# Patient Record
Sex: MALE | Race: WHITE | NOT HISPANIC OR LATINO | Employment: FULL TIME | URBAN - METROPOLITAN AREA
[De-identification: names, ages, dates, MRNs, and addresses within clinical notes are randomized per-mention and may not be internally consistent; named-entity substitution may affect disease eponyms.]

---

## 2023-02-05 PROBLEM — M25.371 INSTABILITY OF RIGHT ANKLE JOINT: Status: ACTIVE | Noted: 2023-02-05

## 2023-02-05 PROBLEM — S93.401A SPRAIN OF RIGHT ANKLE: Status: ACTIVE | Noted: 2023-02-05

## 2023-02-05 PROBLEM — M23.90 INTERNAL DERANGEMENT OF KNEE: Status: ACTIVE | Noted: 2023-02-05

## 2023-02-05 PROBLEM — T14.8XXA AVULSION FRACTURE: Status: ACTIVE | Noted: 2023-02-05

## 2023-02-05 PROBLEM — M25.571 RIGHT ANKLE PAIN: Status: ACTIVE | Noted: 2023-02-05

## 2023-02-05 PROBLEM — E83.119 HEMOCHROMATOSIS: Status: ACTIVE | Noted: 2023-02-05

## 2023-02-05 PROBLEM — E83.110 HEREDITARY HEMOCHROMATOSIS (CMS-HCC): Status: ACTIVE | Noted: 2023-02-05

## 2023-02-05 PROBLEM — H81.12 BENIGN PAROXYSMAL POSITIONAL VERTIGO OF LEFT EAR: Status: ACTIVE | Noted: 2023-02-05

## 2023-02-05 PROBLEM — M71.20 BAKERS CYST: Status: ACTIVE | Noted: 2023-02-05

## 2023-02-05 PROBLEM — L40.9 PSORIASIS: Status: ACTIVE | Noted: 2023-02-05

## 2023-02-05 PROBLEM — M25.569 KNEE PAIN: Status: ACTIVE | Noted: 2023-02-05

## 2023-02-05 PROBLEM — S99.911A RIGHT ANKLE INJURY: Status: ACTIVE | Noted: 2023-02-05

## 2023-02-05 RX ORDER — MELOXICAM 15 MG/1
1 TABLET ORAL DAILY PRN
COMMUNITY
Start: 2022-10-07 | End: 2023-09-05

## 2023-03-28 ENCOUNTER — APPOINTMENT (OUTPATIENT)
Dept: PRIMARY CARE | Facility: CLINIC | Age: 48
End: 2023-03-28
Payer: COMMERCIAL

## 2023-03-28 NOTE — PROGRESS NOTES
Jose Larkin is a 47 y.o. male here today for well adult physical.     HPI       Objective    There were no vitals taken for this visit.    Physical Exam       Assessment      {Assess/Plan SmartLinks (Optional):26394:::1}

## 2023-09-05 ENCOUNTER — OFFICE VISIT (OUTPATIENT)
Dept: PRIMARY CARE | Facility: CLINIC | Age: 48
End: 2023-09-05
Payer: COMMERCIAL

## 2023-09-05 VITALS
DIASTOLIC BLOOD PRESSURE: 82 MMHG | HEART RATE: 63 BPM | BODY MASS INDEX: 25.06 KG/M2 | TEMPERATURE: 97.3 F | SYSTOLIC BLOOD PRESSURE: 120 MMHG | WEIGHT: 179 LBS | RESPIRATION RATE: 16 BRPM | HEIGHT: 71 IN

## 2023-09-05 DIAGNOSIS — M25.462 SWELLING OF JOINT OF LEFT KNEE: Primary | ICD-10-CM

## 2023-09-05 PROCEDURE — 1036F TOBACCO NON-USER: CPT | Performed by: FAMILY MEDICINE

## 2023-09-05 PROCEDURE — 99213 OFFICE O/P EST LOW 20 MIN: CPT | Performed by: FAMILY MEDICINE

## 2023-09-05 NOTE — PROGRESS NOTES
"Jose Larkin is a 47 y.o. male here today for   Chief Complaint   Patient presents with    Knee Pain     Left         HPI   Left knee swelling after an active weekend.  No injury.  He says he was doing a lot of hiking and paddle boarding but these are not new activities for him.  He says the joint is not really painful but some pressure.  Swelling is slightly better today.  Using motrin and ice.  No h/o left knee swelling in the past.  There is no erythema or warmth.  There are no other joints involved.  He is not aware of any tick bites or other insect bites.  He has no history of gout.  He does have a history of arthritis in the right knee for which he has seen Dr. Earl in the past.  I reviewed the notes.      Current Outpatient Medications:     ibuprofen (MOTRIN IB ORAL), Take by mouth., Disp: , Rfl:     Patient Active Problem List   Diagnosis    Avulsion fracture    Bakers cyst    Benign paroxysmal positional vertigo of left ear    Hereditary hemochromatosis (CMS/HCC)    Instability of right ankle joint    Internal derangement of knee    Knee pain    Psoriasis    Right ankle injury    Right ankle pain    Sprain of right ankle    Swelling of joint of left knee         No results found for this or any previous visit (from the past 672 hour(s)).     Objective    Visit Vitals  /82   Pulse 63   Temp 36.3 °C (97.3 °F)   Resp 16   Ht 1.803 m (5' 11\")   Wt 81.2 kg (179 lb)   BMI 24.97 kg/m²     Body mass index is 24.97 kg/m².     Physical Exam   Left knee-there is no erythema or warmth.  Patient does have a moderate effusion of the left knee.  There is mild tenderness on the distal medial quadricep tendon but not anywhere else.  He cannot flex the knee very much because of the effusion.  He has full extension.  Drawer test is negative and patellar movement does not reproduce pain.    Assessment    1. Swelling of joint of left knee     I suspect he may have swelling secondary to arthritis and a very " busy long weekend.  No signs or symptoms of an immediately serious or dangerous etiology.  I recommend that he ice the knee 3 times daily for about 15 minutes and he should also take Motrin 2-3 times daily.  He should rest the knee with no sports or high impact activities.  If his knee is not back to normal in 7 to 10 days he can call us and I will refer him back to orthopedics for further evaluation.  I do not think an x-ray would be helpful at this time.

## 2024-01-10 ENCOUNTER — OFFICE VISIT (OUTPATIENT)
Dept: ORTHOPEDIC SURGERY | Facility: CLINIC | Age: 49
End: 2024-01-10
Payer: COMMERCIAL

## 2024-01-10 ENCOUNTER — ANCILLARY PROCEDURE (OUTPATIENT)
Dept: RADIOLOGY | Facility: CLINIC | Age: 49
End: 2024-01-10
Payer: COMMERCIAL

## 2024-01-10 VITALS — HEIGHT: 71 IN | WEIGHT: 187 LBS | BODY MASS INDEX: 26.18 KG/M2

## 2024-01-10 DIAGNOSIS — M25.562 LEFT KNEE PAIN, UNSPECIFIED CHRONICITY: ICD-10-CM

## 2024-01-10 DIAGNOSIS — S83.242S ACUTE MEDIAL MENISCUS TEAR OF LEFT KNEE, SEQUELA: ICD-10-CM

## 2024-01-10 PROCEDURE — 99214 OFFICE O/P EST MOD 30 MIN: CPT | Performed by: STUDENT IN AN ORGANIZED HEALTH CARE EDUCATION/TRAINING PROGRAM

## 2024-01-10 PROCEDURE — 1036F TOBACCO NON-USER: CPT | Performed by: STUDENT IN AN ORGANIZED HEALTH CARE EDUCATION/TRAINING PROGRAM

## 2024-01-10 PROCEDURE — 73564 X-RAY EXAM KNEE 4 OR MORE: CPT | Mod: LT

## 2024-01-10 RX ORDER — METHYLPREDNISOLONE 4 MG/1
TABLET ORAL
Qty: 21 TABLET | Refills: 0 | Status: SHIPPED | OUTPATIENT
Start: 2024-01-10

## 2024-01-10 ASSESSMENT — PAIN - FUNCTIONAL ASSESSMENT: PAIN_FUNCTIONAL_ASSESSMENT: 0-10

## 2024-01-10 ASSESSMENT — PAIN SCALES - GENERAL: PAINLEVEL_OUTOF10: 2

## 2024-01-10 NOTE — PROGRESS NOTES
Chief Complaint   Patient presents with    Left Knee - Pain     Swelling, clicking, Xrays Today; Hx of Rt Knee OA        HPI  48-year-old female presents today for evaluation of his left knee.  Very active.  Enjoys [employer].  Has been having sharp clicking episodes of instability about his left knee.  Does have a history of right knee arthritis treated conservatively.  Presents today for further evaluation and treatment.  States that he has a very sharp pop that occurs particularly when going from flexion to extension and this is very painful in nature.  Presents today for orthopedic evaluation.  No recent history of trauma    History reviewed. No pertinent past medical history.    Past Surgical History:   Procedure Laterality Date    OTHER SURGICAL HISTORY  11/12/2019    Ankle collateral ligament repair        Allergies   Allergen Reactions    Penicillins Unknown        Physical exam    General: Alert and oriented to place, person, and time.  No acute distress and breathing comfortably; pleasant and cooperative with the examination.  HEENT: Head is normocephalic and atraumatic.  Neck: Supple, no visible swelling.  Cardiovascular: Good perfusion to the affected extremity.  Lungs: No audible wheezing or labored breathing.  Abdomen: Nondistended  HEME/Lymph : No visible abnormalities bilateral lower extremity    Extremity:  Left knee:  Skin healthy and intact  No gross swelling or ecchymosis  No significant varus or valgus malalignment  Effusion: Moderate     ROM:  Full flexion   Full extension  No pain with internal rotation of the hip  Tenderness to palpation: Medial joint line     No laxity to valgus stress  No laxity to varus stress  Negative Lachman´s test  Negative anterior drawer test  Negative posterior drawer test  Positive Gianluca´s test     Neurovascular exam normal distally    Diagnostics:  Multiple views left knee: Mild tricompartmental joint space narrowing osteophyte formation and subchondral  sclerosis consistent with early arthritic change.  No acute osseous abnormality no fracture or dislocation appreciated    Procedure:  Procedures    Assessment:  48-year-old male with concerns for medial meniscus tear    Treatment plan:  The natural history of the condition and its associated treatment alternatives including surgical and nonsurgical options were discussed with the patient at length.  The history and clinical exam are consistent with intraarticular pathology and therefore MRI is medically indicated to evaluate the soft tissues of the joint. Follow up in one week or after completion of the MRI to advance management accordingly  The patient understands and agrees with the plan.  Recommend over-the-counter knee brace  Will provide a prescription for oral Medrol Dosepak to provide some relief  All of the patient's questions were answered.

## 2024-01-24 ENCOUNTER — APPOINTMENT (OUTPATIENT)
Dept: RADIOLOGY | Facility: HOSPITAL | Age: 49
End: 2024-01-24
Payer: COMMERCIAL

## 2024-02-23 ENCOUNTER — HOSPITAL ENCOUNTER (OUTPATIENT)
Dept: RADIOLOGY | Facility: HOSPITAL | Age: 49
Discharge: HOME | End: 2024-02-23
Payer: COMMERCIAL

## 2024-02-23 DIAGNOSIS — S83.242S ACUTE MEDIAL MENISCUS TEAR OF LEFT KNEE, SEQUELA: ICD-10-CM

## 2024-02-23 PROCEDURE — 73721 MRI JNT OF LWR EXTRE W/O DYE: CPT | Mod: LEFT SIDE | Performed by: STUDENT IN AN ORGANIZED HEALTH CARE EDUCATION/TRAINING PROGRAM

## 2024-02-23 PROCEDURE — 73721 MRI JNT OF LWR EXTRE W/O DYE: CPT | Mod: LT

## 2024-03-13 ENCOUNTER — OFFICE VISIT (OUTPATIENT)
Dept: ORTHOPEDIC SURGERY | Facility: CLINIC | Age: 49
End: 2024-03-13
Payer: COMMERCIAL

## 2024-03-13 DIAGNOSIS — S83.242S ACUTE MEDIAL MENISCUS TEAR OF LEFT KNEE, SEQUELA: Primary | ICD-10-CM

## 2024-03-13 PROCEDURE — 2500000005 HC RX 250 GENERAL PHARMACY W/O HCPCS: Performed by: STUDENT IN AN ORGANIZED HEALTH CARE EDUCATION/TRAINING PROGRAM

## 2024-03-13 PROCEDURE — 99214 OFFICE O/P EST MOD 30 MIN: CPT | Performed by: STUDENT IN AN ORGANIZED HEALTH CARE EDUCATION/TRAINING PROGRAM

## 2024-03-13 PROCEDURE — 20610 DRAIN/INJ JOINT/BURSA W/O US: CPT | Performed by: STUDENT IN AN ORGANIZED HEALTH CARE EDUCATION/TRAINING PROGRAM

## 2024-03-13 PROCEDURE — 2500000004 HC RX 250 GENERAL PHARMACY W/ HCPCS (ALT 636 FOR OP/ED): Performed by: STUDENT IN AN ORGANIZED HEALTH CARE EDUCATION/TRAINING PROGRAM

## 2024-03-13 PROCEDURE — 1036F TOBACCO NON-USER: CPT | Performed by: STUDENT IN AN ORGANIZED HEALTH CARE EDUCATION/TRAINING PROGRAM

## 2024-03-13 RX ORDER — LIDOCAINE HYDROCHLORIDE 10 MG/ML
4 INJECTION INFILTRATION; PERINEURAL
Status: COMPLETED | OUTPATIENT
Start: 2024-03-13 | End: 2024-03-13

## 2024-03-13 RX ORDER — TRIAMCINOLONE ACETONIDE 40 MG/ML
40 INJECTION, SUSPENSION INTRA-ARTICULAR; INTRAMUSCULAR
Status: COMPLETED | OUTPATIENT
Start: 2024-03-13 | End: 2024-03-13

## 2024-03-13 RX ADMIN — TRIAMCINOLONE ACETONIDE 40 MG: 40 INJECTION, SUSPENSION INTRA-ARTICULAR; INTRAMUSCULAR at 12:53

## 2024-03-13 RX ADMIN — LIDOCAINE HYDROCHLORIDE 4 ML: 10 INJECTION, SOLUTION INFILTRATION; PERINEURAL at 12:53

## 2024-03-13 NOTE — PROGRESS NOTES
Chief Complaint   Patient presents with    Left Knee - Pain     Lt Knee pain, Concern for Medial Meniscus Tear, S/P MDP,  MRI Review       HPI  Patient presents today for follow up of of his left knee.  Patient very active continues to have clicking catching popping about the medial aspect of the knee as well as soreness swelling and discomfort.        Physical exam  General: Alert and oriented to place, person, and time.  No acute distress and breathing comfortably; pleasant and cooperative with the examination.  Extremity:  Left knee:  Skin healthy and intact  No gross swelling or ecchymosis  No significant varus or valgus malalignment  Effusion: Mild     ROM:  Full flexion   Full extension  No pain with internal rotation of the hip  Tenderness to palpation: Medial joint line     No laxity to valgus stress  No laxity to varus stress  Negative Lachman´s test  Negative anterior drawer test  Negative posterior drawer test  Positive Gianluca´s test     Neurovascular exam normal distally    Diagnostics:  MR knee left wo IV contrast    Result Date: 2/23/2024  Interpreted By:  Dmitry Constantino, STUDY: MRI of the  left knee without IV contrast;  2/23/2024 7:50 pm   INDICATION: Signs/Symptoms:pain.   COMPARISON: 01/10/2024   ACCESSION NUMBER(S): [id number]   ORDERING CLINICIAN: JR MCGUIRE   TECHNIQUE: MR imaging of the  left knee was obtained  without IV contrast.   FINDINGS: LIGAMENTS AND TENDONS: The anterior cruciate ligament and the posterior cruciate ligaments are intact. The medial collateral ligament is intact. The lateral collateral ligament, the biceps femoris tendon, the popliteus tendon and the iliotibial band are intact. The quadriceps tendon and the patellar tendon are intact.   MENISCI: Complex tear of the medial meniscal posterior horn and body segments with horizontal oblique and radial components. The lateral meniscus is intact and without evidence of tear.   JOINTS: The articular cartilage of the  medial femoral condyle and medial tibial plateau is intact and without evidence of full thickness defect. Mild diffuse lateral femorotibial articular cartilage thinning. Mild diffuse patellofemoral articular cartilage thinning, predominantly along the lateral facet. There are scattered focal areas of moderate thinning. Small to moderate volume suprapatellar joint effusion.   OSSEOUS STRUCTURES: No focal marrow replacing lesions are identified. There is no fracture.   SOFT TISSUES: There is no muscle atrophy or tear. The common peroneal nerve is intact.       Complex medial meniscus tear.   Mild to moderate patellofemoral and mild lateral femorotibial articular cartilage thinning.   Small to moderate volume suprapatellar joint effusion.   I personally reviewed the images/study and I agree with the findings as stated. This study was interpreted at [place], [place].   MACRO: None   Signed by: Dmitry Constantino 2/23/2024 7:58 PM Dictation workstation:   [id number]       Procedures  L Inj/Asp: L knee on 3/13/2024 12:53 PM  Indications: pain  Details: 22 G needle, anterolateral approach  Medications: 40 mg triamcinolone acetonide 40 mg/mL; 4 mL lidocaine 10 mg/mL (1 %)  Consent was given by the patient. Immediately prior to procedure a time out was called to verify the correct patient, procedure, equipment, support staff and site/side marked as required. Patient was prepped and draped in the usual sterile fashion.            Assessment:  48-year-old male with medial meniscus tear left knee    Treatment plan:  Will proceed with an intra-articular injection today  I discussed risks and benefits of corticosteroid injection and the patient would like to proceed.  Discussed risks of skin depigmentation and the rare but possible intravascular injection of local anesthetic which can cause palpitations or arrhythmias. I discussed the possibility of a transient increase in blood sugar. I  explained that the local anesthetic tends to work immediately, it may take 2-3 days for the full effect of the corticosteroid compound. Consent was obtained and side confirmed by the patient.    Patient's wife was recently diagnosed with colon cancer and he is rather preoccupied with this at this point in time.  Therefore does wish to proceed with an intra-articular injection of the knee.  He will return to clinic after he has a time in his life to proceed with further potential surgical treatment.  In the meantime we will continue with expectant management.  He will return as needed or when he is having worsening symptoms.  In the meantime discussed activities to avoid as well as importance of using pain as a guide  All of the patient's questions concerns answered

## 2024-03-27 ENCOUNTER — PATIENT MESSAGE (OUTPATIENT)
Dept: PRIMARY CARE | Facility: CLINIC | Age: 49
End: 2024-03-27
Payer: COMMERCIAL

## 2024-03-27 DIAGNOSIS — Z12.11 SCREENING FOR COLON CANCER: ICD-10-CM

## 2024-03-27 NOTE — TELEPHONE ENCOUNTER
From: Jose Larkin  To: Sammy Leal MD  Sent: 3/27/2024 5:38 AM EDT  Subject: Routine Colonoscopy     Looking for a referral for a routine colonoscopy screening. I’m 49 this year and never had one. Wife was diagnosed with colon cancer in January. Promised I’d get screened if eligible

## 2024-09-19 ENCOUNTER — HOSPITAL ENCOUNTER (OUTPATIENT)
Dept: OPERATING ROOM | Facility: CLINIC | Age: 49
Discharge: HOME | End: 2024-09-19
Payer: COMMERCIAL

## 2024-09-19 ENCOUNTER — ANESTHESIA (OUTPATIENT)
Dept: OPERATING ROOM | Facility: CLINIC | Age: 49
End: 2024-09-19
Payer: COMMERCIAL

## 2024-09-19 ENCOUNTER — ANESTHESIA EVENT (OUTPATIENT)
Dept: OPERATING ROOM | Facility: CLINIC | Age: 49
End: 2024-09-19
Payer: COMMERCIAL

## 2024-09-19 VITALS
SYSTOLIC BLOOD PRESSURE: 112 MMHG | HEIGHT: 71 IN | DIASTOLIC BLOOD PRESSURE: 73 MMHG | OXYGEN SATURATION: 96 % | HEART RATE: 60 BPM | WEIGHT: 185.19 LBS | RESPIRATION RATE: 16 BRPM | BODY MASS INDEX: 25.93 KG/M2 | TEMPERATURE: 97.5 F

## 2024-09-19 DIAGNOSIS — Z12.11 SCREENING FOR COLON CANCER: ICD-10-CM

## 2024-09-19 PROCEDURE — 7100000010 HC PHASE TWO TIME - EACH INCREMENTAL 1 MINUTE: Performed by: ANESTHESIOLOGY

## 2024-09-19 PROCEDURE — 3600000002 HC OR TIME - INITIAL BASE CHARGE - PROCEDURE LEVEL TWO: Performed by: ANESTHESIOLOGY

## 2024-09-19 PROCEDURE — 2500000004 HC RX 250 GENERAL PHARMACY W/ HCPCS (ALT 636 FOR OP/ED)

## 2024-09-19 PROCEDURE — 3600000007 HC OR TIME - EACH INCREMENTAL 1 MINUTE - PROCEDURE LEVEL TWO: Performed by: ANESTHESIOLOGY

## 2024-09-19 PROCEDURE — 2500000005 HC RX 250 GENERAL PHARMACY W/O HCPCS

## 2024-09-19 PROCEDURE — 3700000001 HC GENERAL ANESTHESIA TIME - INITIAL BASE CHARGE: Performed by: ANESTHESIOLOGY

## 2024-09-19 PROCEDURE — 7100000009 HC PHASE TWO TIME - INITIAL BASE CHARGE: Performed by: ANESTHESIOLOGY

## 2024-09-19 PROCEDURE — 3700000002 HC GENERAL ANESTHESIA TIME - EACH INCREMENTAL 1 MINUTE: Performed by: ANESTHESIOLOGY

## 2024-09-19 PROCEDURE — 45378 DIAGNOSTIC COLONOSCOPY: CPT | Performed by: INTERNAL MEDICINE

## 2024-09-19 RX ORDER — LIDOCAINE IN NACL,ISO-OSMOT/PF 30 MG/3 ML
0.1 SYRINGE (ML) INJECTION ONCE
Status: DISCONTINUED | OUTPATIENT
Start: 2024-09-19 | End: 2024-09-20 | Stop reason: HOSPADM

## 2024-09-19 RX ORDER — PROPOFOL 10 MG/ML
INJECTION, EMULSION INTRAVENOUS CONTINUOUS PRN
Status: DISCONTINUED | OUTPATIENT
Start: 2024-09-19 | End: 2024-09-19

## 2024-09-19 RX ORDER — ONDANSETRON HYDROCHLORIDE 2 MG/ML
4 INJECTION, SOLUTION INTRAVENOUS ONCE AS NEEDED
Status: DISCONTINUED | OUTPATIENT
Start: 2024-09-19 | End: 2024-09-20 | Stop reason: HOSPADM

## 2024-09-19 RX ORDER — SODIUM CHLORIDE, SODIUM LACTATE, POTASSIUM CHLORIDE, CALCIUM CHLORIDE 600; 310; 30; 20 MG/100ML; MG/100ML; MG/100ML; MG/100ML
100 INJECTION, SOLUTION INTRAVENOUS CONTINUOUS
Status: DISCONTINUED | OUTPATIENT
Start: 2024-09-19 | End: 2024-09-20 | Stop reason: HOSPADM

## 2024-09-19 RX ORDER — LIDOCAINE HYDROCHLORIDE 20 MG/ML
INJECTION, SOLUTION INFILTRATION; PERINEURAL AS NEEDED
Status: DISCONTINUED | OUTPATIENT
Start: 2024-09-19 | End: 2024-09-19

## 2024-09-19 RX ORDER — MIDAZOLAM HYDROCHLORIDE 1 MG/ML
INJECTION, SOLUTION INTRAMUSCULAR; INTRAVENOUS AS NEEDED
Status: DISCONTINUED | OUTPATIENT
Start: 2024-09-19 | End: 2024-09-19

## 2024-09-19 SDOH — HEALTH STABILITY: MENTAL HEALTH: CURRENT SMOKER: 0

## 2024-09-19 ASSESSMENT — ENCOUNTER SYMPTOMS
SHORTNESS OF BREATH: 0
ABDOMINAL DISTENTION: 0
SLEEP DISTURBANCE: 0
FEVER: 0
HEADACHES: 0
ARTHRALGIAS: 0
UNEXPECTED WEIGHT CHANGE: 0
COUGH: 0
WHEEZING: 0
DIFFICULTY URINATING: 0
LIGHT-HEADEDNESS: 0
CONFUSION: 0
CHILLS: 0
DIARRHEA: 0
ABDOMINAL PAIN: 0
TROUBLE SWALLOWING: 0
COLOR CHANGE: 0
NAUSEA: 0
SPEECH DIFFICULTY: 0
DIZZINESS: 0
CONSTIPATION: 0
JOINT SWELLING: 0
VOMITING: 0

## 2024-09-19 ASSESSMENT — PAIN SCALES - GENERAL
PAINLEVEL_OUTOF10: 0 - NO PAIN
PAIN_LEVEL: 0
PAINLEVEL_OUTOF10: 0 - NO PAIN
PAINLEVEL_OUTOF10: 0 - NO PAIN

## 2024-09-19 ASSESSMENT — COLUMBIA-SUICIDE SEVERITY RATING SCALE - C-SSRS
2. HAVE YOU ACTUALLY HAD ANY THOUGHTS OF KILLING YOURSELF?: NO
6. HAVE YOU EVER DONE ANYTHING, STARTED TO DO ANYTHING, OR PREPARED TO DO ANYTHING TO END YOUR LIFE?: NO
1. IN THE PAST MONTH, HAVE YOU WISHED YOU WERE DEAD OR WISHED YOU COULD GO TO SLEEP AND NOT WAKE UP?: NO

## 2024-09-19 ASSESSMENT — PAIN - FUNCTIONAL ASSESSMENT
PAIN_FUNCTIONAL_ASSESSMENT: 0-10

## 2024-09-19 NOTE — H&P
History Of Present Illness  Jose Larkin is a 48 y.o. male presenting with COLONOSCOPY   Past Medical History  No past medical history on file.    Surgical History  Past Surgical History:   Procedure Laterality Date    OTHER SURGICAL HISTORY  11/12/2019    Ankle collateral ligament repair        Social History  He reports that he has never smoked. He has never used smokeless tobacco. He reports current alcohol use. He reports that he does not use drugs.    Family History  Family History   Problem Relation Name Age of Onset    Stroke Father          Allergies  Penicillins    Review of Systems   Constitutional:  Negative for chills, fever and unexpected weight change.   HENT:  Negative for congestion and trouble swallowing.    Respiratory:  Negative for cough, shortness of breath and wheezing.    Cardiovascular:  Negative for chest pain.   Gastrointestinal:  Negative for abdominal distention, abdominal pain, constipation, diarrhea, nausea and vomiting.   Genitourinary:  Negative for difficulty urinating.   Musculoskeletal:  Negative for arthralgias and joint swelling.   Skin:  Negative for color change.   Neurological:  Negative for dizziness, speech difficulty, light-headedness and headaches.   Psychiatric/Behavioral:  Negative for confusion and sleep disturbance.         Physical Exam  Constitutional:       General: He is awake.      Appearance: Normal appearance.   HENT:      Head: Normocephalic and atraumatic.      Nose: Nose normal.      Mouth/Throat:      Mouth: Mucous membranes are moist.   Eyes:      Pupils: Pupils are equal, round, and reactive to light.   Neck:      Thyroid: No thyroid mass.      Trachea: Phonation normal.   Cardiovascular:      Rate and Rhythm: Normal rate and regular rhythm.      Heart sounds: Normal heart sounds. No murmur heard.     No gallop.   Pulmonary:      Effort: Pulmonary effort is normal. No respiratory distress.      Breath sounds: Normal air entry. No decreased breath  sounds, wheezing, rhonchi or rales.   Abdominal:      General: Bowel sounds are normal. There is no distension.      Palpations: Abdomen is soft.      Tenderness: There is no abdominal tenderness.   Musculoskeletal:      Cervical back: Neck supple.      Right lower leg: No edema.      Left lower leg: No edema.   Skin:     General: Skin is warm.      Capillary Refill: Capillary refill takes less than 2 seconds.   Neurological:      General: No focal deficit present.      Mental Status: He is alert and oriented to person, place, and time. Mental status is at baseline.      Cranial Nerves: Cranial nerves 2-12 are intact.      Motor: Motor function is intact.   Psychiatric:         Attention and Perception: Attention and perception normal.         Mood and Affect: Mood normal.         Speech: Speech normal.         Behavior: Behavior normal.          Last Recorded Vitals  There were no vitals taken for this visit.    Relevant Results             Assessment/Plan   Assessment & Plan  Screening for colon cancer             PROCEED WITH COLONOSCOPY  Zac Alaniz MD

## 2024-09-19 NOTE — DISCHARGE INSTRUCTIONS
During the first 24 hours after your procedure, you should:    - Resume normal diet, unless otherwise directed by your doctor.  - Resume your home medications, unless otherwise directed by your doctor.  - Refrain from driving or operative heavy machinery.  - Drink plenty of liquids.  - Avoid consuming alcohol.  - Avoid strenuous activity or heavy lifting.    After 24 hours, you can resume regular activity.    Call your doctor office immediately ([phone]) or come to the nearest emergency room if you experience:    - Abdominal tenderness  - Blood in your stool or vomit  - Difficulty urinating or passing stools  - Difficulty breathing  - Chest pain  - Fever       If you experience any problems or have any questions following discharge from the GI Lab, please call:   Dr. Alaniz [phone].   To reach your physician after hours call [phone] and ask for the GI physician on call.

## 2024-09-19 NOTE — ANESTHESIA POSTPROCEDURE EVALUATION
Patient: Jose Larkin    Procedure Summary       Date: 09/19/24 Room / Location: [facility] OR    Anesthesia Start: 1234 Anesthesia Stop: 1303    Procedure: COLONOSCOPY Diagnosis: Screening for colon cancer    Scheduled Providers: Zac Alaniz MD Responsible Provider: Anthony Hernandez MD    Anesthesia Type: MAC ASA Status: 2            Anesthesia Type: MAC    Vitals Value Taken Time   BP See RN flowsheet 09/19/24 1311   Temp  09/19/24 1311   Pulse  09/19/24 1311   Resp  09/19/24 1311   SpO2  09/19/24 1311       Anesthesia Post Evaluation    Patient location during evaluation: PACU  Patient participation: complete - patient participated  Level of consciousness: awake  Pain score: 0  Pain management: adequate  Airway patency: patent  Cardiovascular status: acceptable  Respiratory status: acceptable  Hydration status: acceptable  Postoperative Nausea and Vomiting: none      There were no known notable events for this encounter.

## 2024-09-19 NOTE — ANESTHESIA PREPROCEDURE EVALUATION
Patient: Jose Larkin    Procedure Information       Date/Time: 09/19/24 1200    Scheduled providers: Zac Alaniz MD    Procedure: COLONOSCOPY    Location: [facility] OR            Relevant Problems   Anesthesia (within normal limits)      Cardiac (within normal limits)      Pulmonary (within normal limits)      Neuro (within normal limits)      /Renal (within normal limits)      Liver (within normal limits)      Endocrine (within normal limits)      Hematology  Hereditary hematochromatosis       HEENT (within normal limits)      ID (within normal limits)       Clinical information reviewed:   Tobacco  Allergies  Meds   Med Hx  Surg Hx   Fam Hx  Soc Hx        NPO Detail:  NPO/Void Status  Carbohydrate Drink Given Prior to Surgery? : N  Date of Last Liquid: 09/19/24  Time of Last Liquid: 0930  Date of Last Solid: 09/18/24  Time of Last Solid: 0633  Last Intake Type: Clear fluids  Time of Last Void: 1138         Physical Exam    Airway  Mallampati: I  TM distance: <3 FB  Neck ROM: full     Cardiovascular    Dental    Pulmonary    Abdominal - normal exam             Anesthesia Plan    History of general anesthesia?: yes  History of complications of general anesthesia?: no    ASA 2     MAC     The patient is not a current smoker.  Patient was not previously instructed to abstain from smoking on day of procedure.  Patient did not smoke on day of procedure.    intravenous induction   Anesthetic plan and risks discussed with patient.  Use of blood products discussed with patient who consented to blood products.    Plan discussed with CAA and attending.

## 2024-09-25 ENCOUNTER — OFFICE VISIT (OUTPATIENT)
Dept: ORTHOPEDIC SURGERY | Facility: CLINIC | Age: 49
End: 2024-09-25
Payer: COMMERCIAL

## 2024-09-25 DIAGNOSIS — S83.242S ACUTE MEDIAL MENISCUS TEAR OF LEFT KNEE, SEQUELA: Primary | ICD-10-CM

## 2024-09-25 PROCEDURE — 99214 OFFICE O/P EST MOD 30 MIN: CPT | Performed by: STUDENT IN AN ORGANIZED HEALTH CARE EDUCATION/TRAINING PROGRAM

## 2024-09-25 PROCEDURE — 99214 OFFICE O/P EST MOD 30 MIN: CPT | Mod: 57 | Performed by: STUDENT IN AN ORGANIZED HEALTH CARE EDUCATION/TRAINING PROGRAM

## 2024-09-25 PROCEDURE — 1036F TOBACCO NON-USER: CPT | Performed by: STUDENT IN AN ORGANIZED HEALTH CARE EDUCATION/TRAINING PROGRAM

## 2024-09-25 NOTE — PROGRESS NOTES
Chief Complaint   Patient presents with    Left Knee - Follow-up, Pain     MMT  Discuss sx       HPI  Patient presents today for follow up of of his left knee.  Patient very active continues to have clicking catching popping about the medial aspect of the knee as well as soreness swelling and discomfort.  We have attempted activity modifications oral anti-inflammatories, intra-articular injections with little to no relief.  Patient presents today in hopes of proceeding with diagnostic knee arthroscopy partial meniscectomy.      Physical exam  General: Alert and oriented to place, person, and time.  No acute distress and breathing comfortably; pleasant and cooperative with the examination.  Extremity:  Left knee:  Skin healthy and intact  No gross swelling or ecchymosis  No significant varus or valgus malalignment  Effusion: Mild     ROM:  Full flexion   Full extension  No pain with internal rotation of the hip  Tenderness to palpation: Medial joint line     No laxity to valgus stress  No laxity to varus stress  Negative Lachman´s test  Negative anterior drawer test  Negative posterior drawer test  Positive Gianluca´s test     Neurovascular exam normal distally    Diagnostics:  MR knee left wo IV contrast    Result Date: 2/23/2024  Interpreted By:  Dmitry Constantino, STUDY: MRI of the  left knee without IV contrast;  2/23/2024 7:50 pm   INDICATION: Signs/Symptoms:pain.   COMPARISON: 01/10/2024   ACCESSION NUMBER(S): [id number]   ORDERING CLINICIAN: JR MCGUIRE   TECHNIQUE: MR imaging of the  left knee was obtained  without IV contrast.   FINDINGS: LIGAMENTS AND TENDONS: The anterior cruciate ligament and the posterior cruciate ligaments are intact. The medial collateral ligament is intact. The lateral collateral ligament, the biceps femoris tendon, the popliteus tendon and the iliotibial band are intact. The quadriceps tendon and the patellar tendon are intact.   MENISCI: Complex tear of the medial meniscal  posterior horn and body segments with horizontal oblique and radial components. The lateral meniscus is intact and without evidence of tear.   JOINTS: The articular cartilage of the medial femoral condyle and medial tibial plateau is intact and without evidence of full thickness defect. Mild diffuse lateral femorotibial articular cartilage thinning. Mild diffuse patellofemoral articular cartilage thinning, predominantly along the lateral facet. There are scattered focal areas of moderate thinning. Small to moderate volume suprapatellar joint effusion.   OSSEOUS STRUCTURES: No focal marrow replacing lesions are identified. There is no fracture.   SOFT TISSUES: There is no muscle atrophy or tear. The common peroneal nerve is intact.       Complex medial meniscus tear.   Mild to moderate patellofemoral and mild lateral femorotibial articular cartilage thinning.   Small to moderate volume suprapatellar joint effusion.   I personally reviewed the images/study and I agree with the findings as stated. This study was interpreted at [place], [place].   MACRO: None   Signed by: Dmitry Constantino 2/23/2024 7:58 PM Dictation workstation:   [id number]       Procedures  Procedures     Assessment:  48-year-old male with medial meniscus tear left knee    Treatment plan:  Constellation of findings discussed with Jose in detail, risk benefits alternative treatment discussed with them as well using shared inform decision making wish to proceed with diagnostic knee scope partial medial meniscectomy.  Will obtain medical clearance prior to proceeding.  He does have some pain about the superior lateral aspect of the knee.  Will also focus on the superolateral aspect of the patella for evaluation of the articular cartilage may also benefit from patellofemoral chondroplasty.    We discussed the options of operative versus nonoperative management with the attendant risks and benefits and the  patient is interested in surgical treatment. I have discussed the alternatives of continued nonoperative treatment with observation, physical therapy, and / or medication versus surgery with the patient and we have thoughtfully considered these options. The patient wishes to proceed with surgical treatment.     We will proceed with left knee diagnostic arthroscopy and partial medial meniscectomy    The planned surgical procedure includes examination under anesthesia. Anesthetic risks will be discussed with the patient by the anesthetist. Arthroscopic evaluation will be performed of the knee joint.  Then an arthroscopic procedure will be performed which may include debridement or repair of the the meniscus or cartilage, synovectomy, and removal of loose bodies.  In addition, if there are advanced degenerative changes I have advised the patient that this may indeed be a progressive process, ultimately resulting in further pain at some point in the future.    Risks of the procedure include but are not limited to infection, bleeding, persistent pain, compartment syndrome, neurologic injury, pressure sores or burns related to positioning or equipment, failure of repair if performed, weakness, arthorfibrosis or stiffness of the joint, limited function and/or limited use of the extremity. The rare complication of death was discussed with the patient. Also, the possible need for revision surgery was discussed.     All this was discussed with the patient and consent obtained. All questions were answered. The patient understands and will consider the surgical options with the above risks in mind. If repair is performed of the meniscus a brace may be provided as  part of a treatment plan which will include wearing the immobilizer at all times.    Regarding DVT prophylaxis, recommend generalized ambulation    We will provide a prescription for Norco 15 tabs the day prior to surgery.  The patient will pick this up today before  surgery in anticipation for surgery/postoperative pain control.

## 2024-10-28 ENCOUNTER — LAB (OUTPATIENT)
Dept: LAB | Facility: LAB | Age: 49
End: 2024-10-28
Payer: COMMERCIAL

## 2024-10-28 ENCOUNTER — HOSPITAL ENCOUNTER (OUTPATIENT)
Dept: CARDIOLOGY | Facility: HOSPITAL | Age: 49
Discharge: HOME | End: 2024-10-28
Payer: COMMERCIAL

## 2024-10-28 DIAGNOSIS — Z01.818 PRE-OP EXAMINATION: ICD-10-CM

## 2024-10-28 LAB
ALBUMIN SERPL BCP-MCNC: 4.3 G/DL (ref 3.4–5)
ALP SERPL-CCNC: 45 U/L (ref 33–120)
ALT SERPL W P-5'-P-CCNC: 12 U/L (ref 10–52)
ANION GAP SERPL CALC-SCNC: 10 MMOL/L (ref 10–20)
APTT PPP: 36 SECONDS (ref 27–38)
AST SERPL W P-5'-P-CCNC: 19 U/L (ref 9–39)
BASOPHILS # BLD AUTO: 0.02 X10*3/UL (ref 0–0.1)
BASOPHILS NFR BLD AUTO: 0.4 %
BILIRUB SERPL-MCNC: 1.1 MG/DL (ref 0–1.2)
BUN SERPL-MCNC: 15 MG/DL (ref 6–23)
CALCIUM SERPL-MCNC: 8.8 MG/DL (ref 8.6–10.3)
CHLORIDE SERPL-SCNC: 106 MMOL/L (ref 98–107)
CO2 SERPL-SCNC: 28 MMOL/L (ref 21–32)
CREAT SERPL-MCNC: 1.12 MG/DL (ref 0.5–1.3)
EGFRCR SERPLBLD CKD-EPI 2021: 81 ML/MIN/1.73M*2
EOSINOPHIL # BLD AUTO: 0.12 X10*3/UL (ref 0–0.7)
EOSINOPHIL NFR BLD AUTO: 2.3 %
ERYTHROCYTE [DISTWIDTH] IN BLOOD BY AUTOMATED COUNT: 12.7 % (ref 11.5–14.5)
GLUCOSE SERPL-MCNC: 90 MG/DL (ref 74–99)
HCT VFR BLD AUTO: 44.6 % (ref 41–52)
HGB BLD-MCNC: 15.9 G/DL (ref 13.5–17.5)
IMM GRANULOCYTES # BLD AUTO: 0 X10*3/UL (ref 0–0.7)
IMM GRANULOCYTES NFR BLD AUTO: 0 % (ref 0–0.9)
INR PPP: 1 (ref 0.9–1.1)
LYMPHOCYTES # BLD AUTO: 2.39 X10*3/UL (ref 1.2–4.8)
LYMPHOCYTES NFR BLD AUTO: 46.7 %
MCH RBC QN AUTO: 32.8 PG (ref 26–34)
MCHC RBC AUTO-ENTMCNC: 35.7 G/DL (ref 32–36)
MCV RBC AUTO: 92 FL (ref 80–100)
MONOCYTES # BLD AUTO: 0.41 X10*3/UL (ref 0.1–1)
MONOCYTES NFR BLD AUTO: 8 %
NEUTROPHILS # BLD AUTO: 2.18 X10*3/UL (ref 1.2–7.7)
NEUTROPHILS NFR BLD AUTO: 42.6 %
NRBC BLD-RTO: 0 /100 WBCS (ref 0–0)
PLATELET # BLD AUTO: 170 X10*3/UL (ref 150–450)
POTASSIUM SERPL-SCNC: 4.1 MMOL/L (ref 3.5–5.3)
PROT SERPL-MCNC: 6.4 G/DL (ref 6.4–8.2)
PROTHROMBIN TIME: 11.8 SECONDS (ref 9.8–12.8)
RBC # BLD AUTO: 4.85 X10*6/UL (ref 4.5–5.9)
SODIUM SERPL-SCNC: 140 MMOL/L (ref 136–145)
WBC # BLD AUTO: 5.1 X10*3/UL (ref 4.4–11.3)

## 2024-10-28 PROCEDURE — 85610 PROTHROMBIN TIME: CPT

## 2024-10-28 PROCEDURE — 93010 ELECTROCARDIOGRAM REPORT: CPT | Performed by: INTERNAL MEDICINE

## 2024-10-28 PROCEDURE — 36415 COLL VENOUS BLD VENIPUNCTURE: CPT

## 2024-10-28 PROCEDURE — 85730 THROMBOPLASTIN TIME PARTIAL: CPT

## 2024-10-28 PROCEDURE — 93005 ELECTROCARDIOGRAM TRACING: CPT

## 2024-10-28 PROCEDURE — 85025 COMPLETE CBC W/AUTO DIFF WBC: CPT

## 2024-10-28 PROCEDURE — 80053 COMPREHEN METABOLIC PANEL: CPT

## 2024-11-01 LAB
ATRIAL RATE: 60 BPM
P AXIS: 12 DEGREES
P OFFSET: 171 MS
P ONSET: 127 MS
PR INTERVAL: 172 MS
Q ONSET: 213 MS
QRS COUNT: 9 BEATS
QRS DURATION: 84 MS
QT INTERVAL: 416 MS
QTC CALCULATION(BAZETT): 416 MS
QTC FREDERICIA: 416 MS
R AXIS: -49 DEGREES
T AXIS: -68 DEGREES
T OFFSET: 421 MS
VENTRICULAR RATE: 60 BPM

## 2024-11-11 ENCOUNTER — APPOINTMENT (OUTPATIENT)
Dept: PRIMARY CARE | Facility: CLINIC | Age: 49
End: 2024-11-11
Payer: COMMERCIAL

## 2024-11-11 VITALS
TEMPERATURE: 97 F | HEIGHT: 71 IN | HEART RATE: 80 BPM | SYSTOLIC BLOOD PRESSURE: 112 MMHG | BODY MASS INDEX: 27.3 KG/M2 | DIASTOLIC BLOOD PRESSURE: 70 MMHG | WEIGHT: 195 LBS

## 2024-11-11 DIAGNOSIS — Z01.818 PREOPERATIVE CLEARANCE: Primary | ICD-10-CM

## 2024-11-11 DIAGNOSIS — E83.110 HEREDITARY HEMOCHROMATOSIS (CMS-HCC): ICD-10-CM

## 2024-11-11 PROCEDURE — 99214 OFFICE O/P EST MOD 30 MIN: CPT | Performed by: FAMILY MEDICINE

## 2024-11-11 PROCEDURE — 1036F TOBACCO NON-USER: CPT | Performed by: FAMILY MEDICINE

## 2024-11-11 PROCEDURE — 3008F BODY MASS INDEX DOCD: CPT | Performed by: FAMILY MEDICINE

## 2024-11-11 NOTE — PROGRESS NOTES
Jose Larkin is a 49 y.o. male here today for   Chief Complaint   Patient presents with    Pre-op Exam        HPI   He will be undergoing a left knee diagnostic arthroscopy and partial meniscectomy with Dr. Sharma.  On 11/26/24 at [facility].      Past medical history-patient is relatively healthy and does not take any chronic prescribed medications.  Hemochromatosis -he has not seen his hematologist for a few years.  His most recent hemoglobin was normal and he has no evidence or history of endorgan damage..    No history of heart disease, lung disease, stroke, clotting disorder or bleeding disorder, seizures.    Surgical history - Left ankle repair.  No anesthesia complications.      Social history -  No tobacco or vaping.  ETOH - 5 per week.  No drug use.    Family history - no anesthesia complications.        Current Outpatient Medications:     ibuprofen (MOTRIN IB ORAL), Take by mouth., Disp: , Rfl:     Patient Active Problem List   Diagnosis    Avulsion fracture    Bakers cyst    Benign paroxysmal positional vertigo of left ear    Hereditary hemochromatosis (CMS-HCC)    Instability of right ankle joint    Internal derangement of knee    Knee pain    Psoriasis    Right ankle injury    Swelling of joint of left knee         Recent Results (from the past 4 weeks)   aPTT    Collection Time: 10/28/24  7:29 AM   Result Value Ref Range    aPTT 36 27 - 38 seconds   CBC and Auto Differential    Collection Time: 10/28/24  7:29 AM   Result Value Ref Range    WBC 5.1 4.4 - 11.3 x10*3/uL    nRBC 0.0 0.0 - 0.0 /100 WBCs    RBC 4.85 4.50 - 5.90 x10*6/uL    Hemoglobin 15.9 13.5 - 17.5 g/dL    Hematocrit 44.6 41.0 - 52.0 %    MCV 92 80 - 100 fL    MCH 32.8 26.0 - 34.0 pg    MCHC 35.7 32.0 - 36.0 g/dL    RDW 12.7 11.5 - 14.5 %    Platelets 170 150 - 450 x10*3/uL    Neutrophils % 42.6 40.0 - 80.0 %    Immature Granulocytes %, Automated 0.0 0.0 - 0.9 %    Lymphocytes % 46.7 13.0 - 44.0 %    Monocytes % 8.0 2.0 - 10.0 %     "Eosinophils % 2.3 0.0 - 6.0 %    Basophils % 0.4 0.0 - 2.0 %    Neutrophils Absolute 2.18 1.20 - 7.70 x10*3/uL    Immature Granulocytes Absolute, Automated 0.00 0.00 - 0.70 x10*3/uL    Lymphocytes Absolute 2.39 1.20 - 4.80 x10*3/uL    Monocytes Absolute 0.41 0.10 - 1.00 x10*3/uL    Eosinophils Absolute 0.12 0.00 - 0.70 x10*3/uL    Basophils Absolute 0.02 0.00 - 0.10 x10*3/uL   Comprehensive Metabolic Panel    Collection Time: 10/28/24  7:29 AM   Result Value Ref Range    Glucose 90 74 - 99 mg/dL    Sodium 140 136 - 145 mmol/L    Potassium 4.1 3.5 - 5.3 mmol/L    Chloride 106 98 - 107 mmol/L    Bicarbonate 28 21 - 32 mmol/L    Anion Gap 10 10 - 20 mmol/L    Urea Nitrogen 15 6 - 23 mg/dL    Creatinine 1.12 0.50 - 1.30 mg/dL    eGFR 81 >60 mL/min/1.73m*2    Calcium 8.8 8.6 - 10.3 mg/dL    Albumin 4.3 3.4 - 5.0 g/dL    Alkaline Phosphatase 45 33 - 120 U/L    Total Protein 6.4 6.4 - 8.2 g/dL    AST 19 9 - 39 U/L    Bilirubin, Total 1.1 0.0 - 1.2 mg/dL    ALT 12 10 - 52 U/L   Protime-INR    Collection Time: 10/28/24  7:29 AM   Result Value Ref Range    Protime 11.8 9.8 - 12.8 seconds    INR 1.0 0.9 - 1.1   ECG 12 Lead    Collection Time: 10/28/24  8:13 AM   Result Value Ref Range    Ventricular Rate 60 BPM    Atrial Rate 60 BPM    WV Interval 172 ms    QRS Duration 84 ms    QT Interval 416 ms    QTC Calculation(Bazett) 416 ms    P Axis 12 degrees    R Axis -49 degrees    T Axis -68 degrees    QRS Count 9 beats    Q Onset 213 ms    P Onset 127 ms    P Offset 171 ms    T Offset 421 ms    QTC Fredericia 416 ms        Objective    Visit Vitals    Visit Vitals  /70   Pulse 80   Temp 36.1 °C (97 °F)   Ht 1.803 m (5' 11\")   Wt 88.5 kg (195 lb)   BMI 27.20 kg/m²   Smoking Status Never   BSA 2.11 m²       Body mass index is 27.2 kg/m².     Physical Exam     General - Not in acute distress and cooperative.  Build & Nutrition - Well developed  Posture - Normal  Gait - Normal  Mental Status - alert and oriented x 3    Head - " Normocephalic    Neck - Thyroid normal size    Eyes - Bilateral - Sclera clear and lids pink without edema or mass.      Skin - Warm and dry with no rashes on visible skin    Lungs - Clear to auscultation and normal breathing effort    Cardiovascular - RRR and no murmurs, rubs or thrill.    Peripheral Vascular - Bilateral - no edema present    Neuropsychiatric - normal mood and affect        Assessment & Plan  Preoperative clearance  Patient is low risk for the upcoming surgery and medically cleared for this surgery.  He is cleared for surgery with general or spinal anesthesia.  I did review his preadmission testing labs as above.       Hereditary hemochromatosis (CMS-HCC)  I recommend that he contact his hematologist to set up a follow-up appointment to recheck his ferritin levels and receive therapeutic phlebotomy if needed.  There is no need to delay his surgery since his hemoglobin and hematocrit are normal.                  No orders of the defined types were placed in this encounter.       No orders of the defined types were placed in this encounter.

## 2024-11-12 PROBLEM — S93.401A SPRAIN OF RIGHT ANKLE: Status: RESOLVED | Noted: 2023-02-05 | Resolved: 2024-11-12

## 2024-11-12 PROBLEM — M25.571 RIGHT ANKLE PAIN: Status: RESOLVED | Noted: 2023-02-05 | Resolved: 2024-11-12

## 2024-11-12 NOTE — ASSESSMENT & PLAN NOTE
I recommend that he contact his hematologist to set up a follow-up appointment to recheck his ferritin levels and receive therapeutic phlebotomy if needed.  There is no need to delay his surgery since his hemoglobin and hematocrit are normal.

## 2024-11-14 ENCOUNTER — TELEPHONE (OUTPATIENT)
Dept: ORTHOPEDIC SURGERY | Facility: CLINIC | Age: 49
End: 2024-11-14
Payer: COMMERCIAL

## 2024-11-14 NOTE — TELEPHONE ENCOUNTER
11/14/24  LVM for pt to contact DME office re availability of crutches needed for post-op use following upcoming sx.

## 2024-11-15 ENCOUNTER — TELEPHONE (OUTPATIENT)
Dept: ORTHOPEDIC SURGERY | Facility: CLINIC | Age: 49
End: 2024-11-15
Payer: COMMERCIAL

## 2024-11-22 ENCOUNTER — APPOINTMENT (OUTPATIENT)
Dept: ORTHOPEDIC SURGERY | Facility: CLINIC | Age: 49
End: 2024-11-22
Payer: COMMERCIAL

## 2024-11-25 DIAGNOSIS — S83.242S ACUTE MEDIAL MENISCUS TEAR OF LEFT KNEE, SEQUELA: Primary | ICD-10-CM

## 2024-11-25 RX ORDER — HYDROCODONE BITARTRATE AND ACETAMINOPHEN 5; 325 MG/1; MG/1
1 TABLET ORAL EVERY 6 HOURS PRN
Qty: 15 TABLET | Refills: 0 | Status: SHIPPED | OUTPATIENT
Start: 2024-11-25

## 2024-11-26 PROCEDURE — 29881 ARTHRS KNE SRG MNISECTMY M/L: CPT | Performed by: STUDENT IN AN ORGANIZED HEALTH CARE EDUCATION/TRAINING PROGRAM

## 2024-12-09 ENCOUNTER — OFFICE VISIT (OUTPATIENT)
Dept: ORTHOPEDIC SURGERY | Facility: CLINIC | Age: 49
End: 2024-12-09
Payer: COMMERCIAL

## 2024-12-09 DIAGNOSIS — Z87.828 STATUS POST ARTHROSCOPIC PARTIAL MEDIAL MENISCECTOMY OF LEFT KNEE: Primary | ICD-10-CM

## 2024-12-09 DIAGNOSIS — Z98.890 STATUS POST ARTHROSCOPIC PARTIAL MEDIAL MENISCECTOMY OF LEFT KNEE: Primary | ICD-10-CM

## 2024-12-09 PROCEDURE — 99211 OFF/OP EST MAY X REQ PHY/QHP: CPT | Performed by: STUDENT IN AN ORGANIZED HEALTH CARE EDUCATION/TRAINING PROGRAM

## 2024-12-09 NOTE — PROGRESS NOTES
Chief Complaint   Patient presents with    Left Knee - Post-op     Arthroscopy partial medial meniscectomy, patellofemoral chondroplasty 11/26/2024       History of Present Illness  Patient is a 49-year-old male presenting today for first postop follow-up after left knee arthroscopy partial medial meniscectomy, patellofemoral chondroplasty.  Patient returns today noting minimal pain.  Endorsing a moderate amount of swelling in his knee down to his ankle however this is improving.  Endorsing a moderate amount of bruising as well.  Denies any calf pain or shortness of breath.       Exam  Mild effusion  Healthy incisions - no active drainage  Good range of motion  No calf swelling  Negative Gianna´s test  Distal neurovascular exam intact     Assessment  Patient status post left knee arthroscopy partial medial meniscectomy, patellofemoral chondroplasty, 2 weeks out     Plan  Reviewed arthroscopic photos and findings at length.  Discussed short and long term implications for the knee.  Discussed analgesics, ice, rest.  Encouraged home exercise program, physical therapy.  Follow-up in 4 weeks  XRays at follow up none    Arthroscopic findings of suprapatellar pouch synovitis, grade 3 Outerbridge changes patella, grade 3/4 Outerbridge changes of the trochlea, grade 4 Outerbridge change of the medial femoral condyle, complex medial meniscus tear all discussed with patient in detail today

## 2025-01-13 ENCOUNTER — OFFICE VISIT (OUTPATIENT)
Dept: ORTHOPEDIC SURGERY | Facility: CLINIC | Age: 50
End: 2025-01-13
Payer: COMMERCIAL

## 2025-01-13 DIAGNOSIS — Z87.828 STATUS POST ARTHROSCOPIC PARTIAL MEDIAL MENISCECTOMY OF LEFT KNEE: Primary | ICD-10-CM

## 2025-01-13 DIAGNOSIS — Z98.890 STATUS POST ARTHROSCOPIC PARTIAL MEDIAL MENISCECTOMY OF LEFT KNEE: Primary | ICD-10-CM

## 2025-01-13 PROCEDURE — 99211 OFF/OP EST MAY X REQ PHY/QHP: CPT | Performed by: STUDENT IN AN ORGANIZED HEALTH CARE EDUCATION/TRAINING PROGRAM

## 2025-03-12 ENCOUNTER — OFFICE VISIT (OUTPATIENT)
Dept: ORTHOPEDIC SURGERY | Facility: CLINIC | Age: 50
End: 2025-03-12
Payer: COMMERCIAL

## 2025-03-12 DIAGNOSIS — Z98.890 STATUS POST ARTHROSCOPIC PARTIAL MEDIAL MENISCECTOMY OF LEFT KNEE: Primary | ICD-10-CM

## 2025-03-12 DIAGNOSIS — Z87.828 STATUS POST ARTHROSCOPIC PARTIAL MEDIAL MENISCECTOMY OF LEFT KNEE: Primary | ICD-10-CM

## 2025-03-12 PROCEDURE — 99213 OFFICE O/P EST LOW 20 MIN: CPT | Performed by: STUDENT IN AN ORGANIZED HEALTH CARE EDUCATION/TRAINING PROGRAM

## 2025-03-12 NOTE — PROGRESS NOTES
Chief Complaint   Patient presents with    Left Knee - Post-op     Arthroscopy partial medial meniscectomy, patellofemoral chondroplasty 11/26/2024       History of Present Illness  Patient returns today noting minimal pain and improving activity level. Denies any calf pain or shortness of breath.    Overall feels like he is making progress since last visit.  Ready to start running.     Exam  Trace effusion  Well healed incisions   Full range of motion   Negative Gianna´s test  Distal neurovascular exam intact     Assessment  Patient status post left knee arthroscopy patellofemoral chondroplasty, partial meniscectomy     Plan  Discussed return to activity and home exercise program.  Follow-up in ~ 8 weeks if still symptomatic   Encouraged home exercise program, physical therapy.   XRays at follow up none    Discussed with Jose that I do want him to begin running.  Discussed activities to avoid as well as importance of using pain as a guide discussed that he did have 2 significant findings with his knee arthroscopy namely knee arthritis as well as complex meniscus tear.  Discussed that he if he is having issues with his knee return to clinic in about 2 to 3 months time.  Discussed activities to avoid as well as importance of using pain as a guide.  I encouraged him to begin gentle jogging

## 2025-06-27 ENCOUNTER — APPOINTMENT (OUTPATIENT)
Dept: PRIMARY CARE | Facility: CLINIC | Age: 50
End: 2025-06-27
Payer: COMMERCIAL

## 2025-06-27 VITALS
HEIGHT: 71 IN | TEMPERATURE: 98.1 F | HEART RATE: 67 BPM | RESPIRATION RATE: 16 BRPM | WEIGHT: 191 LBS | BODY MASS INDEX: 26.74 KG/M2 | DIASTOLIC BLOOD PRESSURE: 80 MMHG | SYSTOLIC BLOOD PRESSURE: 128 MMHG

## 2025-06-27 DIAGNOSIS — E83.110 HEREDITARY HEMOCHROMATOSIS: ICD-10-CM

## 2025-06-27 DIAGNOSIS — Z11.4 SCREENING FOR HIV (HUMAN IMMUNODEFICIENCY VIRUS): ICD-10-CM

## 2025-06-27 DIAGNOSIS — Z00.00 ENCOUNTER FOR PREVENTIVE HEALTH EXAMINATION: ICD-10-CM

## 2025-06-27 DIAGNOSIS — M79.652 LEFT THIGH PAIN: ICD-10-CM

## 2025-06-27 DIAGNOSIS — Z11.59 NEED FOR HEPATITIS C SCREENING TEST: Primary | ICD-10-CM

## 2025-06-27 PROBLEM — Z87.828 HISTORY OF TORN MENISCUS OF LEFT KNEE: Status: ACTIVE | Noted: 2024-03-01

## 2025-06-27 PROCEDURE — 99212 OFFICE O/P EST SF 10 MIN: CPT | Performed by: FAMILY MEDICINE

## 2025-06-27 PROCEDURE — 1036F TOBACCO NON-USER: CPT | Performed by: FAMILY MEDICINE

## 2025-06-27 PROCEDURE — 3008F BODY MASS INDEX DOCD: CPT | Performed by: FAMILY MEDICINE

## 2025-06-27 PROCEDURE — 99396 PREV VISIT EST AGE 40-64: CPT | Performed by: FAMILY MEDICINE

## 2025-06-27 ASSESSMENT — PATIENT HEALTH QUESTIONNAIRE - PHQ9
2. FEELING DOWN, DEPRESSED OR HOPELESS: NOT AT ALL
1. LITTLE INTEREST OR PLEASURE IN DOING THINGS: NOT AT ALL
SUM OF ALL RESPONSES TO PHQ9 QUESTIONS 1 AND 2: 0

## 2025-06-27 NOTE — ASSESSMENT & PLAN NOTE
His previous hematologist has retired and he would like to establish with a new hematologist so I will place a referral.  Orders:    Referral To Hematology and Oncology; Future    Ferritin; Future

## 2025-06-27 NOTE — ASSESSMENT & PLAN NOTE
I am not sure what is causing his recurrent left lateral thigh pain and muscle spasm.  I am going to refer him to sports medicine for further evaluation and treatment.    Orders:    Referral to Sports Medicine; Future

## 2025-06-27 NOTE — PROGRESS NOTES
Jose Larkin is a 49 y.o. male here today a periodic health exam.  I reviewed previous preventative health measures including screening tests, immunizations and labs.      HPI   CURRENT COMPLAINTS OR CONCERNS:    Left knee meniscus repair in November.      Sees dermatologist annually.    He has been getting a muscle spasm or muscle knot in his left lateral thigh that occurs about once a week.  He says it has been present for at least a year and seems to be getting more prevalent.  He says when it is sore he has to lay on the floor to put direct pressure on it and stretch and eventually starts to relax.  He has no associated back pain.     PREVIOUS PREVENTATIVE HEALTH    PSA :  NO  Colonoscopy : YES -- DATE 9/19/2024  Cologuard :  NO  Prevnar/Pneumovax : NO  Shingrix : NO  Hepatitis C Antibody :  NO  HIV Screening : NO  Tdap : YES -- DATE 3/16/2020   Yearly Flu Shot :  NO    CURRENT FINDINGS  Healthy Diet : YES  Exercise :  YES --  regularly  Depression or Anxiety Issues : NO  Alcohol Use :  Yes - about 6 per week.  Tobacco Use : NO  Drug Use : NO      Past Medical History    Patient Active Problem List    Diagnosis Date Noted    Hereditary hemochromatosis 02/05/2023    Left thigh pain 06/27/2025    History of torn meniscus of left knee 03/01/2024    Swelling of joint of left knee 09/05/2023    Avulsion fracture 02/05/2023    Bakers cyst 02/05/2023    Benign paroxysmal positional vertigo of left ear 02/05/2023    Instability of right ankle joint 02/05/2023    Internal derangement of knee 02/05/2023    Knee pain 02/05/2023    Psoriasis 02/05/2023    Right ankle injury 02/05/2023       Surgical History[1]     Current Outpatient Medications   Medication Instructions    ibuprofen (MOTRIN IB ORAL) Take by mouth.        Immunization History   Administered Date(s) Administered    Flu vaccine, trivalent, preservative free, age 6 months and greater (Fluarix/Fluzone/Flulaval) 08/01/2014    Moderna SARS-CoV-2 Vaccination  "03/25/2021, 04/21/2021, 11/24/2021    Tdap vaccine, age 7 year and older (BOOSTRIX, ADACEL) 03/16/2020        Social History  Social History     Socioeconomic History    Marital status:      Spouse name: Not on file    Number of children: Not on file    Years of education: Not on file    Highest education level: Not on file   Occupational History    Not on file   Tobacco Use    Smoking status: Never    Smokeless tobacco: Never   Vaping Use    Vaping status: Never Used   Substance and Sexual Activity    Alcohol use: Yes    Drug use: Never    Sexual activity: Not on file   Other Topics Concern    Not on file   Social History Narrative    Not on file     Social Drivers of Health     Financial Resource Strain: Not on file   Food Insecurity: Not on file   Transportation Needs: Not on file   Physical Activity: Not on file   Stress: Not on file   Social Connections: Not on file   Intimate Partner Violence: Not on file   Housing Stability: Not on file        reports current alcohol use.    reports that he has never smoked. He has never used smokeless tobacco.    reports no history of drug use.           Allergies  Penicillins      Physical Exam  Visit Vitals  /80   Pulse 67   Temp 36.7 °C (98.1 °F)   Resp 16   Ht 1.803 m (5' 11\")   Wt 86.6 kg (191 lb)   BMI 26.64 kg/m²   Smoking Status Never   BSA 2.08 m²     Body mass index is 26.64 kg/m².    Physical Exam  Vitals and nursing note reviewed.   Constitutional:       General: He is not in acute distress.     Appearance: Normal appearance.   HENT:      Head: Normocephalic and atraumatic.      Right Ear: Tympanic membrane, ear canal and external ear normal.      Left Ear: Tympanic membrane, ear canal and external ear normal.      Nose: Nose normal.      Mouth/Throat:      Mouth: Mucous membranes are moist.      Pharynx: Oropharynx is clear.   Eyes:      Extraocular Movements: Extraocular movements intact.      Conjunctiva/sclera: Conjunctivae normal.      Pupils: " Pupils are equal, round, and reactive to light.   Cardiovascular:      Rate and Rhythm: Normal rate and regular rhythm.      Pulses: Normal pulses.      Heart sounds: Normal heart sounds. No murmur heard.     No friction rub. No gallop.   Pulmonary:      Effort: Pulmonary effort is normal. No respiratory distress.      Breath sounds: Normal breath sounds.   Abdominal:      General: Abdomen is flat. Bowel sounds are normal. There is no distension.      Palpations: Abdomen is soft.      Tenderness: There is no abdominal tenderness.   Musculoskeletal:         General: Normal range of motion.      Cervical back: Normal range of motion and neck supple.   Lymphadenopathy:      Cervical: No cervical adenopathy.   Skin:     General: Skin is warm and dry.      Findings: No lesion or rash.   Neurological:      General: No focal deficit present.      Mental Status: He is alert. Mental status is at baseline.   Psychiatric:         Mood and Affect: Mood normal.         Behavior: Behavior normal.         Thought Content: Thought content normal.         Judgment: Judgment normal.                 Assessment      Assessment & Plan  Encounter for preventive health examination  Recommend regular exercise, balanced diet, regular dental exams, and healthy habits.  Appropriate labs ordered or reviewed.  I recommend to eat plenty of plant foods (such as whole-grain products, fruits, and vegetables) and a moderate amount of lean and low-fat, animal-based food (meat and dairy products).  When shopping, choose lean meats, fish, and poultry. I recommend to continue regular aerobic exercise.  I recommend a yearly flu shot in the fall and I recommend a yearly wellness exam.        Orders:    Referral To Hematology and Oncology; Future    Comprehensive Metabolic Panel; Future    CBC; Future    Lipid Panel; Future    Ferritin; Future    Need for hepatitis C screening test    Orders:    Hepatitis C Antibody; Future    Screening for HIV (human  immunodeficiency virus)    Orders:    HIV 1/2 Antigen/Antibody Screen with Reflex to Confirmation; Future    Hereditary hemochromatosis  His previous hematologist has retired and he would like to establish with a new hematologist so I will place a referral.  Orders:    Referral To Hematology and Oncology; Future    Ferritin; Future    Left thigh pain  I am not sure what is causing his recurrent left lateral thigh pain and muscle spasm.  I am going to refer him to sports medicine for further evaluation and treatment.    Orders:    Referral to Sports Medicine; Future           Anticipatory Guidance     Eat well: Eat a balanced diet that includes lots of fruits and vegetables, whole grains, nuts, seeds, and legumes. Limit processed foods, sugar, saturated fat, and salt. Aim to eat at least five servings of fruits and vegetables per day, and no more than 1 teaspoon of salt.    Exercise: Try to exercise at least 30 minutes most days of the week.    Sleep: Aim to get 7-9 hours of sleep each night. Establish a bedtime routine and create a sleep-friendly environment.    Stay hydrated: Drink water and limit sugary beverages.    Reduce sitting time: Be mindful of your screen time.    Keep company with good people:  Set limits and boundaries.  Be selective.    Manage stress: Take breaks from the news, talk with someone you trust.    Other tips: Avoid drugs, tobacco and vaping.  Maintain a healthy weight, get regular health checkups, and limit alcohol              Orders Placed This Encounter   Procedures    Comprehensive Metabolic Panel    CBC    Lipid Panel    Ferritin    Hepatitis C Antibody    HIV 1/2 Antigen/Antibody Screen with Reflex to Confirmation    Referral To Hematology and Oncology    Referral to Sports Medicine        No orders of the defined types were placed in this encounter.            [1]   Past Surgical History:  Procedure Laterality Date    OTHER SURGICAL HISTORY  11/12/2019    Ankle collateral ligament  repair

## 2025-07-03 LAB
ALBUMIN SERPL-MCNC: 4.5 G/DL (ref 3.6–5.1)
ALP SERPL-CCNC: 53 U/L (ref 36–130)
ALT SERPL-CCNC: 19 U/L (ref 9–46)
ANION GAP SERPL CALCULATED.4IONS-SCNC: 8 MMOL/L (CALC) (ref 7–17)
AST SERPL-CCNC: 22 U/L (ref 10–40)
BILIRUB SERPL-MCNC: 0.8 MG/DL (ref 0.2–1.2)
BUN SERPL-MCNC: 19 MG/DL (ref 7–25)
CALCIUM SERPL-MCNC: 9.3 MG/DL (ref 8.6–10.3)
CHLORIDE SERPL-SCNC: 105 MMOL/L (ref 98–110)
CHOLEST SERPL-MCNC: 147 MG/DL
CHOLEST/HDLC SERPL: 2.8 (CALC)
CO2 SERPL-SCNC: 29 MMOL/L (ref 20–32)
CREAT SERPL-MCNC: 1.14 MG/DL (ref 0.6–1.29)
EGFRCR SERPLBLD CKD-EPI 2021: 79 ML/MIN/1.73M2
ERYTHROCYTE [DISTWIDTH] IN BLOOD BY AUTOMATED COUNT: 13.3 % (ref 11–15)
FERRITIN SERPL-MCNC: 267 NG/ML (ref 38–380)
GLUCOSE SERPL-MCNC: 87 MG/DL (ref 65–139)
HCT VFR BLD AUTO: 47.6 % (ref 38.5–50)
HCV AB SERPL QL IA: NORMAL
HDLC SERPL-MCNC: 52 MG/DL
HGB BLD-MCNC: 16.2 G/DL (ref 13.2–17.1)
HIV 1+2 AB+HIV1 P24 AG SERPL QL IA: NORMAL
HIV 1+2 AB+HIV1 P24 AG SERPL QL IA: NORMAL
LDLC SERPL CALC-MCNC: 73 MG/DL (CALC)
MCH RBC QN AUTO: 33.1 PG (ref 27–33)
MCHC RBC AUTO-ENTMCNC: 34 G/DL (ref 32–36)
MCV RBC AUTO: 97.3 FL (ref 80–100)
NONHDLC SERPL-MCNC: 95 MG/DL (CALC)
PLATELET # BLD AUTO: 200 THOUSAND/UL (ref 140–400)
PMV BLD REES-ECKER: 10.4 FL (ref 7.5–12.5)
POTASSIUM SERPL-SCNC: 4.4 MMOL/L (ref 3.5–5.3)
PROT SERPL-MCNC: 6.7 G/DL (ref 6.1–8.1)
RBC # BLD AUTO: 4.89 MILLION/UL (ref 4.2–5.8)
SODIUM SERPL-SCNC: 142 MMOL/L (ref 135–146)
TRIGL SERPL-MCNC: 138 MG/DL
WBC # BLD AUTO: 6.8 THOUSAND/UL (ref 3.8–10.8)

## 2025-07-11 ENCOUNTER — APPOINTMENT (OUTPATIENT)
Dept: ORTHOPEDIC SURGERY | Facility: CLINIC | Age: 50
End: 2025-07-11
Payer: COMMERCIAL

## 2025-07-11 ENCOUNTER — ANCILLARY PROCEDURE (OUTPATIENT)
Dept: ORTHOPEDIC SURGERY | Facility: CLINIC | Age: 50
End: 2025-07-11
Payer: COMMERCIAL

## 2025-07-11 DIAGNOSIS — M76.32 ILIOTIBIAL BAND SYNDROME OF LEFT SIDE: ICD-10-CM

## 2025-07-11 DIAGNOSIS — M79.652 LEFT THIGH PAIN: ICD-10-CM

## 2025-07-11 DIAGNOSIS — M25.852 FEMOROACETABULAR IMPINGEMENT OF LEFT HIP: ICD-10-CM

## 2025-07-11 DIAGNOSIS — M54.16 LUMBAR RADICULOPATHY: ICD-10-CM

## 2025-07-11 PROCEDURE — 99214 OFFICE O/P EST MOD 30 MIN: CPT | Performed by: STUDENT IN AN ORGANIZED HEALTH CARE EDUCATION/TRAINING PROGRAM

## 2025-07-11 PROCEDURE — 73552 X-RAY EXAM OF FEMUR 2/>: CPT | Mod: LEFT SIDE | Performed by: STUDENT IN AN ORGANIZED HEALTH CARE EDUCATION/TRAINING PROGRAM

## 2025-07-11 NOTE — PROGRESS NOTES
"Sports Medicine Office Note    Today's Date:  07/11/2025     HPI: Jose Larkin is a 49 y.o. male with history of left knee arthroscopy patellofemoral chondroplasty, partial meniscectomy 11/26/24 who presents today for left lateral thigh pain.  Patient reports 3 to 4-year history of left lateral thigh pain that he describes as burning in nature.  Patient reports he is a avid runner and previous triathlete.  Previous workup demonstrated meniscal tear for which he had surgery with Dr. Sharma November 2024.  Patient reports continued left lateral thigh pain unchanged after meniscus surgery.  Patient denies new fall, trauma, injury, extremity weakness.  Patient feels intermittent tingling in his left 3/4 toes.  Denies saddle anesthesia, bowel or bladder incontinence.    He has no other complaints.    Physical Examination:     SKIN: No increased erythema, warmth, rashes, or concerning skin lesions.  NEURO: Sensation is intact in the bilateral lower extremities. Strength is grossly 5 out of 5 throughout the bilateral lower extremities, unless noted below.  MUSCULOSKELETAL: The {Blank single:31915::\" RIGHT\",\" LEFT\"} hip and pelvis are without obvious signs of acute bony deformity or instability. Active and passive range of motion are {Blank single:77615::\" full and painful\",\" limited and painful\",\" full and pain-free\"}. Log roll is {Blank single:95988::\" positive\",\" negative\"}. Straight leg raise test is {Blank single:76185::\" positive\",\" negative\"}. *** elicited with STACI/FADIR.  {Blank single:11098::\" There is\",\" There is no\"} tenderness to palpation over the greater trochanter and gluteal musculature/tendons. Trendelenburg {Blank single:01929::\" positive\",\" negative\"}. Chayo {Blank single:49154::\" positive\",\" negative\"}. Hip strength is weak as compared to the opposite hip. The opposite hip is otherwise nontender and stable. Gait is antalgic and tandem.      Imaging:  Radiographs of the left thigh were obtained " today and were reviewed and revealed no acute osseous abnormality.    The studies were reviewed by me personally in the office today.    === 01/10/24 ===    XR KNEE LEFT 4+ VIEWS    - Impression -  No acute fracture or dislocation. Mild tricompartmental degenerative  changes of the left knee.      Signed by: Vijay Constantino 1/11/2024 9:16 AM  Dictation workstation:   [id number]    Problem List Items Addressed This Visit           ICD-10-CM       Musculoskeletal and Injuries    Left thigh pain M79.652    Relevant Orders    XR femur left 2+ views     Other Visit Diagnoses         Codes      Lumbar radiculopathy     M54.16    Relevant Orders    XR lumbar spine complete 4+ views      Femoroacetabular impingement of left hip     M25.852    Relevant Orders    Referral to Physical Therapy      Iliotibial band syndrome of left side     M76.32    Relevant Orders    Referral to Physical Therapy          Assessment and Plan:    We reviewed the exam and imaging findings and discussed the conservative and surgical treatment options. We agreed ***. Physical therapy referral *** provided and discussed the importance of regular home exercises.  Recommended prescription doses of Tylenol and encouraged use of ice or heat as needed for acute flares of pain.  Plan for follow-up in *** for re-evaluation, otherwise may follow-up sooner if any new concerns arise.  Discussed this plan with the patient who is understanding and agreeable.    **This note was dictated using Dragon speech recognition software and was not corrected for spelling or grammatical errors**.    Lee Whitehead DO  Primary Care Sports Medicine  [facility] [place]

## 2025-07-14 ENCOUNTER — HOSPITAL ENCOUNTER (OUTPATIENT)
Dept: RADIOLOGY | Facility: CLINIC | Age: 50
Discharge: HOME | End: 2025-07-14
Payer: COMMERCIAL

## 2025-07-14 DIAGNOSIS — M54.16 LUMBAR RADICULOPATHY: ICD-10-CM

## 2025-07-14 PROCEDURE — 72110 X-RAY EXAM L-2 SPINE 4/>VWS: CPT

## 2025-07-14 PROCEDURE — 72110 X-RAY EXAM L-2 SPINE 4/>VWS: CPT | Performed by: RADIOLOGY

## 2025-07-25 ENCOUNTER — APPOINTMENT (OUTPATIENT)
Dept: HEMATOLOGY/ONCOLOGY | Facility: CLINIC | Age: 50
End: 2025-07-25
Payer: COMMERCIAL

## 2025-07-28 ENCOUNTER — APPOINTMENT (OUTPATIENT)
Dept: ORTHOPEDIC SURGERY | Facility: CLINIC | Age: 50
End: 2025-07-28
Payer: COMMERCIAL

## 2025-08-04 ENCOUNTER — APPOINTMENT (OUTPATIENT)
Dept: PHYSICAL THERAPY | Facility: CLINIC | Age: 50
End: 2025-08-04
Payer: COMMERCIAL

## 2025-08-04 DIAGNOSIS — M79.652 LEFT THIGH PAIN: Primary | ICD-10-CM

## 2025-08-04 PROCEDURE — 97110 THERAPEUTIC EXERCISES: CPT | Mod: GP

## 2025-08-04 PROCEDURE — 97161 PT EVAL LOW COMPLEX 20 MIN: CPT | Mod: GP

## 2025-08-04 NOTE — PROGRESS NOTES
Physical Therapy Evaluation and Treatment      Patient Name: Jose Larkin  MRN: [id number]  Today's Date: 8/4/2025  Visit #1  Time Calculation  Start Time: 1433  Stop Time: 1505  Time Calculation (min): 32 min    Insurance:  Info: 2025 20% COINS, 3800 DED (MET) 42494 OOP MAX, VS MED NEC, NO AUTH     Assessment:  Jose Larkin is a 49 year old M referred to outpatient PT for L lateral thigh pain.  At this time, his familiar sx seem muscular in nature and likely related to some combination of glute tendonopathy, and IT band pain/snapping. He subjectively describes some foot paresthesia suggesting possible lumbar origin but ultimately unable to reproduce any of these sx today. Patient will benefit from physical therapy services to improve listed impairments. Initiated treatment today to address these impairments.     Patient with the following impairments: decreased muscle performance, decreased ROM, decreased activity tolerance, pain, participation restrictions, impaired balance/gait, and difficulty with ADL completion    Patient's response to session: No change in pain, Increase motor control, and Increased knowledge and understanding    Next Session Considerations:  Add hip flexor marching w/tb  Band walks  Seated Hip Flexion w/ABD/ADD over cone   other for IT band strength.     Plan:     1 times every week for a total of 10 visits.  Re-assessment after that time.    Current Problem:   1. Left thigh pain            Subjective   Jose Larkin is a 49 year old M referred to outpatient PT for L lateral thigh pain. Pt reports he has been having hip and leg pain to the point he sleeps on the floor at times to have the firm compression on it. It is aggravated when he is more active and seems to be better when he relaxes. It started about a year ago and he has tried multiple treatments options. The thigh pain is on the lateral portion of his thigh and hip. It usually stays in the lateral thigh, he can sometimes  "feel the \"thud\" of the knottiness along his IT band. Occasionally he gets some numbness in the tips of his toes seems to be when the thigh is worse.   Onset Date:   BERNY: Insidious onset.     Pt describes pain as stabbing, throbbing, burning.    Pain is worse with activity.    Pain is better with Laying on the floor, \"super stretch it\"     Pain Rating: (Numeric Pain Scale: #/10)  Current: 4  Best: 0  Worst: 8    Patient denies numbness/tingling, changes in bowel/bladder, saddle paresthesias, and falls.     Relevant Information (PMH & Previous Tests/Imaging): x-ray, no significant Hip findings.     Hx of:  - CVA n  - Heart conditions (afib, HTN, [place]) n  - DM n  - Major Surgeries: L meniscectomy.   - Unexplained Weight Loss/Gain: n      Previous Interventions/Treatments: none     Prior Level of Function (PLOF)  Exercise/Physical Activity: Running        Patient Goal: \"Non-surgical relief.\"                   Objective   Lower Quarter Screen:  -Dermatomes: intact BLE    DTR (L/R)  WNL bilaterally.     LE Myotomes: BLE WNL    Specific Lower Extremity MMT (L/R)  Gluteus Ramírez (prone): 4/5, 4/5  Gluteus Medius: 4-/5, 4/5    Lumbar ROM  Flexion: No loss  Extension: No loss      Repeated Motions  Baseline sx: Min sx in lateral thigh  Flexion: x10 standing   Sx afterward: questionable increase. No peripheralization of sx.     Baseline sx: min sx lateral thigh  Extension: x10 prone pressups.   Sx afterward: LBP, no change in LE sx.     Hip ROM (L/R)  Flexion: 125°/125°  Abduction: 45°/45°  Extension: 10°/10°  External Rotation: 45°/45°  Internal rotation: 45°/45° - minimally provoking of familiar sx.     Special Tests (L/R): (p = positive, n = negative)  Straight Leg Raise: n, n  Wells Crossed SLR: n, n  Slump Test: nt, nt  Femoral Dural Tension: n, n  Scour: n    Joint mobility testing (normal unless otherwise noted below)  Proximal Tibia-Fibular Joint:  PFJ:    Additional Palpable Tenderness/Trigger Points: No palpable " tenderness/trigger points reproduced at this time.     Gait: No gross abnormalities noted w/functional mobility.    Outcome Measures:  LEFS = 6/80       Treatments:  Therapeutic Exercise (27790): 15 minutes   X1 set of ea HEP activity  Patient was educated in depth on relevant anatomy, physiology and potential pathophysiology/PT diagnosis. Discussed tx approach, plan of care, activity modification, symptom monitoring, and home exercise program. Pt provided written handout and verbal education on exercises performed below. Pt instructed to stop exercises if pain occurs during performance at home.  Discussed monitoring foot/toe paresthesia abdiel provoking factors.         HEP:  Access Code: [email or web address]  URL: [email or web address]  Date: 08/04/2025  Prepared by: Perfecto Pike    Exercises  - Clamshell with Resistance (Mirrored)  - 1-2 x daily - 5-7 x weekly - 1 sets - 5 reps - 30sec hold  - Single Leg Bridge (Mirrored)  - 1 x daily - 3-4 x weekly - 3 sets - 10 reps  - Supine Straight Leg Raise with Internal Rotation (Mirrored)  - 1 x daily - 3-4 x weekly - 3 sets - 10 reps  ^  Education and discussion on HEP and treatment regarding the benefits related to current condition, POC, pathophysiology, and precautions        Goals:  Patient will improve Lower Extremity Functional Scale score to < 4/80 to improve sx w/ADLs.     Patient will be independent with home exercise program for proper self-management of condition.    Patient will improve pain free active range of motion in deficit areas for ADL completion.    Patient will improve strength in deficit areas so patient can perform ADLs with less pain.    Lateral thigh pain will improve to be able to sleep in his bed without disruptions for 6 hours daily to achieve adequate rest    Patient will subjectively report the ability to run for >/= 30min without familiar sx > 2/10.

## 2025-08-05 ENCOUNTER — LAB (OUTPATIENT)
Dept: LAB | Facility: CLINIC | Age: 50
End: 2025-08-05
Payer: COMMERCIAL

## 2025-08-05 ENCOUNTER — OFFICE VISIT (OUTPATIENT)
Dept: HEMATOLOGY/ONCOLOGY | Facility: CLINIC | Age: 50
End: 2025-08-05
Payer: COMMERCIAL

## 2025-08-05 VITALS
BODY MASS INDEX: 26.58 KG/M2 | SYSTOLIC BLOOD PRESSURE: 116 MMHG | RESPIRATION RATE: 16 BRPM | WEIGHT: 190.6 LBS | DIASTOLIC BLOOD PRESSURE: 83 MMHG | OXYGEN SATURATION: 93 % | HEART RATE: 62 BPM | TEMPERATURE: 97.9 F

## 2025-08-05 DIAGNOSIS — E83.110 HEREDITARY HEMOCHROMATOSIS: ICD-10-CM

## 2025-08-05 DIAGNOSIS — E83.110 HEREDITARY HEMOCHROMATOSIS: Primary | ICD-10-CM

## 2025-08-05 LAB
ALBUMIN SERPL BCP-MCNC: 4.5 G/DL (ref 3.4–5)
ALP SERPL-CCNC: 59 U/L (ref 33–120)
ALT SERPL W P-5'-P-CCNC: 27 U/L (ref 10–52)
ANION GAP SERPL CALC-SCNC: 12 MMOL/L (ref 10–20)
AST SERPL W P-5'-P-CCNC: 30 U/L (ref 9–39)
BILIRUB SERPL-MCNC: 0.8 MG/DL (ref 0–1.2)
BUN SERPL-MCNC: 12 MG/DL (ref 6–23)
CALCIUM SERPL-MCNC: 9.6 MG/DL (ref 8.6–10.3)
CHLORIDE SERPL-SCNC: 104 MMOL/L (ref 98–107)
CO2 SERPL-SCNC: 29 MMOL/L (ref 21–32)
CREAT SERPL-MCNC: 1.22 MG/DL (ref 0.5–1.3)
EGFRCR SERPLBLD CKD-EPI 2021: 73 ML/MIN/1.73M*2
ERYTHROCYTE [DISTWIDTH] IN BLOOD BY AUTOMATED COUNT: 12.8 % (ref 11.5–14.5)
GLUCOSE SERPL-MCNC: 105 MG/DL (ref 74–99)
HCT VFR BLD AUTO: 46.8 % (ref 41–52)
HGB BLD-MCNC: 16 G/DL (ref 13.5–17.5)
MCH RBC QN AUTO: 32.3 PG (ref 26–34)
MCHC RBC AUTO-ENTMCNC: 34.2 G/DL (ref 32–36)
MCV RBC AUTO: 94 FL (ref 80–100)
NRBC BLD-RTO: NORMAL /100{WBCS}
PLATELET # BLD AUTO: 154 X10*3/UL (ref 150–450)
POTASSIUM SERPL-SCNC: 4.1 MMOL/L (ref 3.5–5.3)
PROT SERPL-MCNC: 7 G/DL (ref 6.4–8.2)
RBC # BLD AUTO: 4.96 X10*6/UL (ref 4.5–5.9)
SODIUM SERPL-SCNC: 141 MMOL/L (ref 136–145)
WBC # BLD AUTO: 5.1 X10*3/UL (ref 4.4–11.3)

## 2025-08-05 PROCEDURE — 85027 COMPLETE CBC AUTOMATED: CPT

## 2025-08-05 PROCEDURE — 99214 OFFICE O/P EST MOD 30 MIN: CPT | Mod: 25 | Performed by: STUDENT IN AN ORGANIZED HEALTH CARE EDUCATION/TRAINING PROGRAM

## 2025-08-05 PROCEDURE — 83540 ASSAY OF IRON: CPT

## 2025-08-05 PROCEDURE — 99204 OFFICE O/P NEW MOD 45 MIN: CPT | Performed by: STUDENT IN AN ORGANIZED HEALTH CARE EDUCATION/TRAINING PROGRAM

## 2025-08-05 PROCEDURE — 85652 RBC SED RATE AUTOMATED: CPT

## 2025-08-05 PROCEDURE — 82728 ASSAY OF FERRITIN: CPT

## 2025-08-05 PROCEDURE — 83615 LACTATE (LD) (LDH) ENZYME: CPT

## 2025-08-05 PROCEDURE — 36415 COLL VENOUS BLD VENIPUNCTURE: CPT

## 2025-08-05 PROCEDURE — 80053 COMPREHEN METABOLIC PANEL: CPT

## 2025-08-05 PROCEDURE — 86140 C-REACTIVE PROTEIN: CPT

## 2025-08-05 ASSESSMENT — PAIN SCALES - GENERAL: PAINLEVEL_OUTOF10: 0-NO PAIN

## 2025-08-05 NOTE — PROGRESS NOTES
Patient ID: Jose Larkin is a 49 y.o. male.  Referring Physician: Sammy Leal MD  [address] [place] [address]  Primary Care Provider: Sammy Leal MD  Visit Type: Initial Visit      Subjective    HPI  Mr. Larkin is a 49 year old male with PMHx significant for hereditary hemochromatosis who presented to our clinic for further management of his hemochromatosis. Per chart review he was diagnosed in his 20's. Most recent labs done on 7/2/25 showed ferritin of 267, hgb 16.2, HCT 47.6, normal WBC and no differential reported. He used to follow up with Dr. Jackson's [facility] meredith at [place], which she has retired and he has not follow up with anyone since 2022. His brother and sister are also tested and they were found to have hereditary hemochromatosis. He has had joint pain in hands, knees and hip. No issues with glycemic control. He denied abdominal pain, skin discoloration or hyperpigmentation, or impotence, chest pain, shortness of breath, orthopnea. His last phlebotomy was in 2022. He had a colonoscopy this year and had no polyps removed and he is scheduled for repeat in 10 years. He has not had any personal history of arterial or venous thrombosis.     Previous history:   3/29/2021  -diagnosed in his 20's with hereditary hemochromatosis, and donated at [facility] regularly, then stopped.  -about 3-5 years ago was seen by hematology at [facility] and retested for hereditary hemochromatosis (he does not know the specific result) and again had several phlebotomies at the center but stopped going.  -on 3/24/21 had labs with annual wellness visit and ferritin is 581. 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  -no c/o, feels well.     Lab:  3/24/2021  WBC 5.4, Hgb 17.1, Hct 49.8, MCV 96, Plt 162; creat  1.06, Ca+ 9.7, t bili 1.3, ast 34, alt 38, alkp 78; ferr 581     5/11/2021  HFE test done 5/25/2018 at [facility] was obtained and shows C282Y homozygous mutations. He started phlebotomies 4/13 and has had two procedures  which he has tolerated well. Reports mild fatigue for a day or two and he adjusts his training schedule a bit. He prefers to continue regular phlebotomies now so he would be able to take a pause around the October full Iron Man Competition event.     6/22/2021  Has been getting phlebotomy every 2 weeks, total of 6 now, and iron levels slowly dropping but still showing ferritin 290 and saturation  of 62%. He said he has some fatigue for a few days after procedure.     8/20/2021  Has had 3 more phlebotomies, with a brief break for his athletic competition. Ferritin levels are improved, saturation fluctuating.  Overall feels well. Has a full iron man competition on 10/2. He thinks a phlebotomy mid Sept will give him adequate recovery time.     10/19/2021  Last phlebotomy was 6 weeks ago on 9/3/21; ferritin had dropped to 57. He completed the Iron Man Competition - satisfied with outcomes.  Feels well today.      3/8/2022 Has been 12 weeks since last phlebotomy, as he missed one appointment due to travel. Feels well, no c/o today.  Review of Systems - Oncology     Objective   BSA: There is no height or weight on file to calculate BSA.  There were no vitals taken for this visit.     has a past medical history of Arthritis (2010), Fracture of ankle (1990), Hemochromatosis (2005), Right ankle pain (02/05/2023), and Sprain of right ankle (02/05/2023).   has a past surgical history that includes Other surgical history (11/12/2019); Ankle fracture surgery (1990); Knee Arthroplasty (2024); and Vasectomy (2013).  Family History[1]  Oncology History    No history exists.       Jose Larkin  reports that he quit smoking about 19 years ago. His smoking use included cigarettes. He has a 10 pack-year smoking history.  He has never used smokeless tobacco.  He  reports current alcohol use of about 6.0 standard drinks of alcohol per week.  He  reports no history of drug use.  Works for "[employer]   Physical Exam  Constitutional:       Appearance: Normal appearance.   HENT:      Head: Normocephalic.      Mouth/Throat:      Mouth: Mucous membranes are moist.     Eyes:      Extraocular Movements: Extraocular movements intact.      Pupils: Pupils are equal, round, and reactive to light.       Cardiovascular:      Rate and Rhythm: Normal rate and regular rhythm.   Pulmonary:      Effort: Pulmonary effort is normal.      Breath sounds: Normal breath sounds.   Abdominal:      General: Abdomen is flat. There is no distension.      Palpations: Abdomen is soft. There is no mass.      Tenderness: There is no abdominal tenderness. There is no guarding.     Musculoskeletal:         General: No swelling.      Cervical back: Normal range of motion. No rigidity.      Right lower leg: No edema.      Left lower leg: No edema.   Lymphadenopathy:      Cervical: No cervical adenopathy.     Skin:     General: Skin is warm and dry.     Neurological:      General: No focal deficit present.      Mental Status: He is alert and oriented to person, place, and time.     Psychiatric:         Mood and Affect: Mood normal.         Behavior: Behavior normal.         WBC   Date/Time Value Ref Range Status   10/28/2024 07:29 AM 5.1 4.4 - 11.3 x10*3/uL Final   05/03/2022 08:34 AM 5.9 4.4 - 11.3 x10E9/L Final   03/07/2022 04:18 PM 6.6 4.4 - 11.3 x10E9/L Final   12/13/2021 02:00 PM 5.9 4.4 - 11.3 x10E9/L Final     WHITE BLOOD CELL COUNT   Date/Time Value Ref Range Status   07/02/2025 04:16 PM 6.8 3.8 - 10.8 Thousand/uL Final     nRBC   Date Value Ref Range Status   10/28/2024 0.0 0.0 - 0.0 /100 WBCs Final     RBC   Date Value Ref Range Status   10/28/2024 4.85 4.50 - 5.90 x10*6/uL Final   05/03/2022 4.97 4.50 - 5.90 x10E12/L Final   03/07/2022 4.88 4.50 - 5.90  x10E12/L Final   12/13/2021 4.90 4.50 - 5.90 x10E12/L Final     RED BLOOD CELL COUNT   Date Value Ref Range Status   07/02/2025 4.89 4.20 - 5.80 Million/uL Final     Hemoglobin   Date Value Ref Range Status   10/28/2024 15.9 13.5 - 17.5 g/dL Final   05/03/2022 16.2 13.5 - 17.5 g/dL Final   03/07/2022 15.9 13.5 - 17.5 g/dL Final   12/13/2021 16.0 13.5 - 17.5 g/dL Final     HEMOGLOBIN   Date Value Ref Range Status   07/02/2025 16.2 13.2 - 17.1 g/dL Final     Hematocrit   Date Value Ref Range Status   10/28/2024 44.6 41.0 - 52.0 % Final   05/03/2022 47.5 41.0 - 52.0 % Final   03/07/2022 45.5 41.0 - 52.0 % Final   12/13/2021 46.0 41.0 - 52.0 % Final     HEMATOCRIT   Date Value Ref Range Status   07/02/2025 47.6 38.5 - 50.0 % Final     MCV   Date/Time Value Ref Range Status   07/02/2025 04:16 PM 97.3 80.0 - 100.0 fL Final   10/28/2024 07:29 AM 92 80 - 100 fL Final   05/03/2022 08:34 AM 96 80 - 100 fL Final   03/07/2022 04:18 PM 93 80 - 100 fL Final   12/13/2021 02:00 PM 94 80 - 100 fL Final     MCH   Date/Time Value Ref Range Status   07/02/2025 04:16 PM 33.1 (H) 27.0 - 33.0 pg Final   10/28/2024 07:29 AM 32.8 26.0 - 34.0 pg Final     MCHC   Date/Time Value Ref Range Status   07/02/2025 04:16 PM 34.0 32.0 - 36.0 g/dL Final     Comment:     For adults, a slight decrease in the calculated MCHC  value (in the range of 30 to 32 g/dL) is most likely  not clinically significant; however, it should be  interpreted with caution in correlation with other  red cell parameters and the patient's clinical  condition.     10/28/2024 07:29 AM 35.7 32.0 - 36.0 g/dL Final   05/03/2022 08:34 AM 34.1 32.0 - 36.0 g/dL Final   03/07/2022 04:18 PM 34.9 32.0 - 36.0 g/dL Final   12/13/2021 02:00 PM 34.8 32.0 - 36.0 g/dL Final     RDW   Date/Time Value Ref Range Status   07/02/2025 04:16 PM 13.3 11.0 - 15.0 % Final   10/28/2024 07:29 AM 12.7 11.5 - 14.5 % Final   05/03/2022 08:34 AM 13.1 11.5 - 14.5 % Final   03/07/2022 04:18 PM 12.6 11.5 - 14.5  % Final   12/13/2021 02:00 PM 12.8 11.5 - 14.5 % Final     Platelets   Date/Time Value Ref Range Status   10/28/2024 07:29  150 - 450 x10*3/uL Final   05/03/2022 08:34  150 - 450 x10E9/L Final   03/07/2022 04:18  150 - 450 x10E9/L Final   12/13/2021 02:00  150 - 450 x10E9/L Final     PLATELET COUNT   Date/Time Value Ref Range Status   07/02/2025 04:16  140 - 400 Thousand/uL Final     MPV   Date/Time Value Ref Range Status   07/02/2025 04:16 PM 10.4 7.5 - 12.5 fL Final     Neutrophils %   Date/Time Value Ref Range Status   10/28/2024 07:29 AM 42.6 40.0 - 80.0 % Final   05/03/2022 08:34 AM 44.3 40.0 - 80.0 % Final   03/07/2022 04:18 PM 66.1 40.0 - 80.0 % Final   12/13/2021 02:00 PM 51.1 40.0 - 80.0 % Final     Immature Granulocytes %, Automated   Date/Time Value Ref Range Status   10/28/2024 07:29 AM 0.0 0.0 - 0.9 % Final     Comment:     Immature Granulocyte Count (IG) includes promyelocytes, myelocytes and metamyelocytes but does not include bands. Percent differential counts (%) should be interpreted in the context of the absolute cell counts (cells/UL).   05/03/2022 08:34 AM 0.2 0.0 - 0.9 % Final     Comment:      Immature Granulocyte Count (IG) includes promyelocytes,    myelocytes and metamyelocytes but does not include bands.   Percent differential counts (%) should be interpreted in the   context of the absolute cell counts (cells/L).     03/07/2022 04:18 PM 0.0 0.0 - 0.9 % Final     Comment:      Immature Granulocyte Count (IG) includes promyelocytes,    myelocytes and metamyelocytes but does not include bands.   Percent differential counts (%) should be interpreted in the   context of the absolute cell counts (cells/L).     12/13/2021 02:00 PM 0.2 0.0 - 0.9 % Final     Comment:      Immature Granulocyte Count (IG) includes promyelocytes,    myelocytes and metamyelocytes but does not include bands.   Percent differential counts (%) should be interpreted in the   context of the  absolute cell counts (cells/L).       Lymphocytes %   Date/Time Value Ref Range Status   10/28/2024 07:29 AM 46.7 13.0 - 44.0 % Final   05/03/2022 08:34 AM 44.5 13.0 - 44.0 % Final   03/07/2022 04:18 PM 25.6 13.0 - 44.0 % Final   12/13/2021 02:00 PM 38.1 13.0 - 44.0 % Final     Monocytes %   Date/Time Value Ref Range Status   10/28/2024 07:29 AM 8.0 2.0 - 10.0 % Final   05/03/2022 08:34 AM 9.0 2.0 - 10.0 % Final   03/07/2022 04:18 PM 7.5 2.0 - 10.0 % Final   12/13/2021 02:00 PM 8.4 2.0 - 10.0 % Final     Eosinophils %   Date/Time Value Ref Range Status   10/28/2024 07:29 AM 2.3 0.0 - 6.0 % Final   05/03/2022 08:34 AM 1.7 0.0 - 6.0 % Final   03/07/2022 04:18 PM 0.5 0.0 - 6.0 % Final   12/13/2021 02:00 PM 1.9 0.0 - 6.0 % Final     Basophils %   Date/Time Value Ref Range Status   10/28/2024 07:29 AM 0.4 0.0 - 2.0 % Final   05/03/2022 08:34 AM 0.3 0.0 - 2.0 % Final   03/07/2022 04:18 PM 0.3 0.0 - 2.0 % Final   12/13/2021 02:00 PM 0.3 0.0 - 2.0 % Final     Neutrophils Absolute   Date/Time Value Ref Range Status   10/28/2024 07:29 AM 2.18 1.20 - 7.70 x10*3/uL Final     Comment:     Percent differential counts (%) should be interpreted in the context of the absolute cell counts (cells/uL).   05/03/2022 08:34 AM 2.59 1.20 - 7.70 x10E9/L Final   03/07/2022 04:18 PM 4.34 1.20 - 7.70 x10E9/L Final   12/13/2021 02:00 PM 2.99 1.20 - 7.70 x10E9/L Final     Immature Granulocytes Absolute, Automated   Date/Time Value Ref Range Status   10/28/2024 07:29 AM 0.00 0.00 - 0.70 x10*3/uL Final     Lymphocytes Absolute   Date/Time Value Ref Range Status   10/28/2024 07:29 AM 2.39 1.20 - 4.80 x10*3/uL Final   05/03/2022 08:34 AM 2.61 1.20 - 4.80 x10E9/L Final   03/07/2022 04:18 PM 1.68 1.20 - 4.80 x10E9/L Final   12/13/2021 02:00 PM 2.23 1.20 - 4.80 x10E9/L Final     Monocytes Absolute   Date/Time Value Ref Range Status   10/28/2024 07:29 AM 0.41 0.10 - 1.00 x10*3/uL Final   05/03/2022 08:34 AM 0.53 0.10 - 1.00 x10E9/L Final   03/07/2022  "04:18 PM 0.49 0.10 - 1.00 x10E9/L Final   12/13/2021 02:00 PM 0.49 0.10 - 1.00 x10E9/L Final     Eosinophils Absolute   Date/Time Value Ref Range Status   10/28/2024 07:29 AM 0.12 0.00 - 0.70 x10*3/uL Final   05/03/2022 08:34 AM 0.10 0.00 - 0.70 x10E9/L Final   03/07/2022 04:18 PM 0.03 0.00 - 0.70 x10E9/L Final   12/13/2021 02:00 PM 0.11 0.00 - 0.70 x10E9/L Final     Basophils Absolute   Date/Time Value Ref Range Status   10/28/2024 07:29 AM 0.02 0.00 - 0.10 x10*3/uL Final   05/03/2022 08:34 AM 0.02 0.00 - 0.10 x10E9/L Final   03/07/2022 04:18 PM 0.02 0.00 - 0.10 x10E9/L Final   12/13/2021 02:00 PM 0.02 0.00 - 0.10 x10E9/L Final       No components found for: \"PT\"  aPTT   Date/Time Value Ref Range Status   10/28/2024 07:29 AM 36 27 - 38 seconds Final       Assessment/Plan    Mr. Larkin is a 49 year old male with PMHx significant for hereditary hemochromatosis who presented to our clinic for further management of his hemochromatosis.     3/29/2021  -diagnosed in his 20's with hereditary hemochromatosis, and donated at [facility] regularly, then stopped.  -about 3-5 years ago was seen by hematology at [place] and retested for hereditary hemochromatosis (he does not know the specific result) and again had several phlebotomies at the center but stopped going.  -on 3/24/21 had labs with annual wellness visit and ferritin is 581. 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  -no c/o, feels well.     Lab: 3/24/2021  WBC 5.4, Hgb 17.1, Hct 49.8, MCV 96, Plt 162; creat 1.06, Ca+ 9.7, t bili 1.3, ast 34, alt 38, alkp 78; ferr 581     5/11/2021  HFE test done 5/25/2018 at [facility] was obtained and shows C282Y homozygous mutations. He started phlebotomies 4/13 and has had two procedures  which he has tolerated well. Reports mild fatigue for " a day or two and he adjusts his training schedule a bit. He prefers to continue regular phlebotomies now so he would be able to take a pause around the October full Iron Man Competition event.     6/22/2021  Has been getting phlebotomy every 2 weeks, total of 6 now, and iron levels slowly dropping but still showing ferritin 290 and saturation  of 62%. He said he has some fatigue for a few days after procedure.     8/20/2021  Has had 3 more phlebotomies, with a brief break for his athletic competition. Ferritin levels are improved, saturation fluctuating.  Overall feels well. Has a full iron man competition on 10/2. He thinks a phlebotomy mid Sept will give him adequate recovery time.     10/19/2021  Last phlebotomy was 6 weeks ago on 9/3/21; ferritin had dropped to 57. He completed the Iron Man Competition - satisfied with outcomes.  Feels well today.      3/8/2022 Has been 12 weeks since last phlebotomy, as he missed one appointment due to travel. Feels well, no c/o today.     Diagnoses and all orders for this visit:  Hereditary hemochromatosis  -     Will obtain outside records to ensure homozygous C282Y  -     Had a discussion about Mr. Larkin diagnosis and reviewed possible complication of iron overload involving different organs and the need for further work up if indicated based on labs and clinical picture.   -     Ideally would like to keep ferritin between . Will consider phlebotomy if indicated per the labs below.  -     Mr. Larkin was educated on limiting his alcohol intake.   -     CBC; Future  -     Comprehensive Metabolic Panel; Future  -     C-Reactive Protein; Future  -     Ferritin; Future  -     Iron and TIBC; Future  -     Lactate Dehydrogenase; Future  -     Sedimentation Rate; Future  -     Clinic Appointment Request; Future  Other orders  -     RTC in 1 month or earlier if needed.        A total of 60 minutes were spent in evaluation - performing physical examination, history  taking, review of the previous extensive records/information and formulating current and future management plan. Majority of the time was spend in consultation and discussion.    This note has been transcribed using Dragon voice recognition system and there is a possibility of unintentional typing misprints.   Uriel Chin DO  Hematology and Medical Oncology                             [1]   Family History  Problem Relation Name Age of Onset    Stroke Father      Hemochromatosis Sister Dianne     Hemochromatosis Brother Dale

## 2025-08-06 LAB
CRP SERPL-MCNC: 1.89 MG/DL
ERYTHROCYTE [SEDIMENTATION RATE] IN BLOOD BY WESTERGREN METHOD: 1 MM/H (ref 0–15)
FERRITIN SERPL-MCNC: 466 NG/ML (ref 20–300)
IRON SATN MFR SERPL: 25 % (ref 25–45)
IRON SERPL-MCNC: 61 UG/DL (ref 35–150)
LDH SERPL L TO P-CCNC: 186 U/L (ref 84–246)
TIBC SERPL-MCNC: 245 UG/DL (ref 240–445)
UIBC SERPL-MCNC: 184 UG/DL (ref 110–370)

## 2025-08-15 ENCOUNTER — APPOINTMENT (OUTPATIENT)
Dept: PHYSICAL THERAPY | Facility: CLINIC | Age: 50
End: 2025-08-15
Payer: COMMERCIAL

## 2025-08-15 DIAGNOSIS — M79.652 LEFT THIGH PAIN: Primary | ICD-10-CM

## 2025-08-19 DIAGNOSIS — E83.110 HEREDITARY HEMOCHROMATOSIS: Primary | ICD-10-CM

## 2025-08-19 RX ORDER — ALBUTEROL SULFATE 0.83 MG/ML
3 SOLUTION RESPIRATORY (INHALATION) AS NEEDED
OUTPATIENT
Start: 2025-08-27

## 2025-08-19 RX ORDER — EPINEPHRINE 0.3 MG/.3ML
0.3 INJECTION SUBCUTANEOUS EVERY 5 MIN PRN
OUTPATIENT
Start: 2025-08-27

## 2025-08-19 RX ORDER — DIPHENHYDRAMINE HYDROCHLORIDE 50 MG/ML
50 INJECTION, SOLUTION INTRAMUSCULAR; INTRAVENOUS AS NEEDED
OUTPATIENT
Start: 2025-08-27

## 2025-08-19 RX ORDER — FAMOTIDINE 10 MG/ML
20 INJECTION, SOLUTION INTRAVENOUS ONCE AS NEEDED
OUTPATIENT
Start: 2025-08-27

## 2025-08-20 ENCOUNTER — TREATMENT (OUTPATIENT)
Dept: PHYSICAL THERAPY | Facility: CLINIC | Age: 50
End: 2025-08-20
Payer: COMMERCIAL

## 2025-08-20 DIAGNOSIS — M79.652 LEFT THIGH PAIN: Primary | ICD-10-CM

## 2025-08-20 PROCEDURE — 97110 THERAPEUTIC EXERCISES: CPT | Mod: GP

## 2025-08-22 ENCOUNTER — APPOINTMENT (OUTPATIENT)
Dept: ORTHOPEDIC SURGERY | Facility: CLINIC | Age: 50
End: 2025-08-22
Payer: COMMERCIAL

## 2025-08-27 ENCOUNTER — INFUSION (OUTPATIENT)
Dept: HEMATOLOGY/ONCOLOGY | Facility: CLINIC | Age: 50
End: 2025-08-27
Payer: COMMERCIAL

## 2025-08-27 ENCOUNTER — TREATMENT (OUTPATIENT)
Dept: PHYSICAL THERAPY | Facility: CLINIC | Age: 50
End: 2025-08-27
Payer: COMMERCIAL

## 2025-08-27 VITALS
HEART RATE: 67 BPM | SYSTOLIC BLOOD PRESSURE: 117 MMHG | BODY MASS INDEX: 26.47 KG/M2 | WEIGHT: 189.82 LBS | RESPIRATION RATE: 18 BRPM | TEMPERATURE: 97.9 F | OXYGEN SATURATION: 98 % | DIASTOLIC BLOOD PRESSURE: 74 MMHG

## 2025-08-27 DIAGNOSIS — E83.110 HEREDITARY HEMOCHROMATOSIS: ICD-10-CM

## 2025-08-27 DIAGNOSIS — M79.652 LEFT THIGH PAIN: Primary | ICD-10-CM

## 2025-08-27 PROCEDURE — 99195 PHLEBOTOMY: CPT

## 2025-08-27 PROCEDURE — 97110 THERAPEUTIC EXERCISES: CPT | Mod: GP

## 2025-08-27 RX ORDER — DIPHENHYDRAMINE HYDROCHLORIDE 50 MG/ML
50 INJECTION, SOLUTION INTRAMUSCULAR; INTRAVENOUS AS NEEDED
OUTPATIENT
Start: 2025-08-27

## 2025-08-27 RX ORDER — EPINEPHRINE 0.3 MG/.3ML
0.3 INJECTION SUBCUTANEOUS EVERY 5 MIN PRN
OUTPATIENT
Start: 2025-08-27

## 2025-08-27 RX ORDER — FAMOTIDINE 10 MG/ML
20 INJECTION, SOLUTION INTRAVENOUS ONCE AS NEEDED
OUTPATIENT
Start: 2025-08-27

## 2025-08-27 RX ORDER — ALBUTEROL SULFATE 0.83 MG/ML
3 SOLUTION RESPIRATORY (INHALATION) AS NEEDED
OUTPATIENT
Start: 2025-08-27

## 2025-08-29 ENCOUNTER — APPOINTMENT (OUTPATIENT)
Dept: ORTHOPEDIC SURGERY | Facility: CLINIC | Age: 50
End: 2025-08-29
Payer: COMMERCIAL

## 2025-08-29 DIAGNOSIS — M76.32 ILIOTIBIAL BAND SYNDROME OF LEFT SIDE: ICD-10-CM

## 2025-08-29 DIAGNOSIS — M51.26 HERNIATION OF LEFT SIDE OF L4-L5 INTERVERTEBRAL DISC: ICD-10-CM

## 2025-08-29 DIAGNOSIS — M79.652 LEFT THIGH PAIN: ICD-10-CM

## 2025-08-29 DIAGNOSIS — M54.16 LUMBAR RADICULOPATHY: Primary | ICD-10-CM

## 2025-08-29 DIAGNOSIS — M25.852 FEMOROACETABULAR IMPINGEMENT OF LEFT HIP: ICD-10-CM

## 2025-08-29 PROCEDURE — 99214 OFFICE O/P EST MOD 30 MIN: CPT | Performed by: STUDENT IN AN ORGANIZED HEALTH CARE EDUCATION/TRAINING PROGRAM

## 2025-09-03 ENCOUNTER — OFFICE VISIT (OUTPATIENT)
Dept: HEMATOLOGY/ONCOLOGY | Facility: CLINIC | Age: 50
End: 2025-09-03
Payer: COMMERCIAL

## 2025-09-03 ENCOUNTER — LAB (OUTPATIENT)
Dept: LAB | Facility: CLINIC | Age: 50
End: 2025-09-03
Payer: COMMERCIAL

## 2025-09-03 VITALS
OXYGEN SATURATION: 96 % | DIASTOLIC BLOOD PRESSURE: 77 MMHG | TEMPERATURE: 97.5 F | WEIGHT: 185.1 LBS | RESPIRATION RATE: 16 BRPM | SYSTOLIC BLOOD PRESSURE: 117 MMHG | BODY MASS INDEX: 25.82 KG/M2 | HEART RATE: 83 BPM

## 2025-09-03 DIAGNOSIS — E83.110 HEREDITARY HEMOCHROMATOSIS: ICD-10-CM

## 2025-09-03 LAB
ANION GAP SERPL CALC-SCNC: 11 MMOL/L (ref 10–20)
BASOPHILS # BLD AUTO: 0.02 X10*3/UL (ref 0–0.1)
BASOPHILS NFR BLD AUTO: 0.3 %
BUN SERPL-MCNC: 16 MG/DL (ref 6–23)
CALCIUM SERPL-MCNC: 9.2 MG/DL (ref 8.6–10.3)
CHLORIDE SERPL-SCNC: 107 MMOL/L (ref 98–107)
CO2 SERPL-SCNC: 28 MMOL/L (ref 21–32)
CREAT SERPL-MCNC: 1.27 MG/DL (ref 0.5–1.3)
EGFRCR SERPLBLD CKD-EPI 2021: 69 ML/MIN/1.73M*2
EOSINOPHIL # BLD AUTO: 0.09 X10*3/UL (ref 0–0.7)
EOSINOPHIL NFR BLD AUTO: 1.5 %
ERYTHROCYTE [DISTWIDTH] IN BLOOD BY AUTOMATED COUNT: 13.3 % (ref 11.5–14.5)
GLUCOSE SERPL-MCNC: 99 MG/DL (ref 74–99)
HCT VFR BLD AUTO: 43.7 % (ref 41–52)
HGB BLD-MCNC: 14.9 G/DL (ref 13.5–17.5)
IMM GRANULOCYTES # BLD AUTO: 0 X10*3/UL (ref 0–0.7)
IMM GRANULOCYTES NFR BLD AUTO: 0 % (ref 0–0.9)
LYMPHOCYTES # BLD AUTO: 1.91 X10*3/UL (ref 1.2–4.8)
LYMPHOCYTES NFR BLD AUTO: 32.5 %
MCH RBC QN AUTO: 32.3 PG (ref 26–34)
MCHC RBC AUTO-ENTMCNC: 34.1 G/DL (ref 32–36)
MCV RBC AUTO: 95 FL (ref 80–100)
MONOCYTES # BLD AUTO: 0.49 X10*3/UL (ref 0.1–1)
MONOCYTES NFR BLD AUTO: 8.3 %
NEUTROPHILS # BLD AUTO: 3.36 X10*3/UL (ref 1.2–7.7)
NEUTROPHILS NFR BLD AUTO: 57.4 %
PLATELET # BLD AUTO: 179 X10*3/UL (ref 150–450)
POTASSIUM SERPL-SCNC: 4 MMOL/L (ref 3.5–5.3)
RBC # BLD AUTO: 4.62 X10*6/UL (ref 4.5–5.9)
SODIUM SERPL-SCNC: 142 MMOL/L (ref 136–145)
WBC # BLD AUTO: 5.9 X10*3/UL (ref 4.4–11.3)

## 2025-09-03 PROCEDURE — 82374 ASSAY BLOOD CARBON DIOXIDE: CPT

## 2025-09-03 PROCEDURE — 83540 ASSAY OF IRON: CPT

## 2025-09-03 PROCEDURE — 1036F TOBACCO NON-USER: CPT | Performed by: STUDENT IN AN ORGANIZED HEALTH CARE EDUCATION/TRAINING PROGRAM

## 2025-09-03 PROCEDURE — 36415 COLL VENOUS BLD VENIPUNCTURE: CPT

## 2025-09-03 PROCEDURE — 99213 OFFICE O/P EST LOW 20 MIN: CPT | Performed by: STUDENT IN AN ORGANIZED HEALTH CARE EDUCATION/TRAINING PROGRAM

## 2025-09-03 PROCEDURE — 82728 ASSAY OF FERRITIN: CPT

## 2025-09-03 PROCEDURE — 85025 COMPLETE CBC W/AUTO DIFF WBC: CPT

## 2025-09-03 ASSESSMENT — PAIN SCALES - GENERAL: PAINLEVEL_OUTOF10: 0-NO PAIN

## 2025-09-04 DIAGNOSIS — E83.110 HEREDITARY HEMOCHROMATOSIS: Primary | ICD-10-CM

## 2025-09-04 LAB
FERRITIN SERPL-MCNC: 377 NG/ML (ref 20–300)
IRON SATN MFR SERPL: 64 % (ref 25–45)
IRON SERPL-MCNC: 166 UG/DL (ref 35–150)
TIBC SERPL-MCNC: 258 UG/DL (ref 240–445)
UIBC SERPL-MCNC: 92 UG/DL (ref 110–370)

## 2025-09-09 ENCOUNTER — APPOINTMENT (OUTPATIENT)
Dept: HEMATOLOGY/ONCOLOGY | Facility: CLINIC | Age: 50
End: 2025-09-09
Payer: COMMERCIAL

## 2026-06-30 ENCOUNTER — APPOINTMENT (OUTPATIENT)
Dept: PRIMARY CARE | Facility: CLINIC | Age: 51
End: 2026-06-30
Payer: COMMERCIAL